# Patient Record
Sex: MALE | Race: BLACK OR AFRICAN AMERICAN | NOT HISPANIC OR LATINO | Employment: FULL TIME | URBAN - METROPOLITAN AREA
[De-identification: names, ages, dates, MRNs, and addresses within clinical notes are randomized per-mention and may not be internally consistent; named-entity substitution may affect disease eponyms.]

---

## 2017-02-17 ENCOUNTER — HOSPITAL ENCOUNTER (OUTPATIENT)
Facility: HOSPITAL | Age: 62
Discharge: HOME | End: 2017-02-23
Attending: EMERGENCY MEDICINE | Admitting: INTERNAL MEDICINE
Payer: COMMERCIAL

## 2017-02-17 DIAGNOSIS — E78.5 HYPERLIPIDEMIA, UNSPECIFIED HYPERLIPIDEMIA TYPE: ICD-10-CM

## 2017-02-17 DIAGNOSIS — R62.7 ADULT FAILURE TO THRIVE: ICD-10-CM

## 2017-02-17 DIAGNOSIS — M54.50 LOW BACK PAIN: ICD-10-CM

## 2017-02-17 DIAGNOSIS — F32.A DEPRESSION, UNSPECIFIED DEPRESSION TYPE: ICD-10-CM

## 2017-02-17 DIAGNOSIS — E11.40 TYPE 2 DIABETES MELLITUS WITH DIABETIC NEUROPATHY, WITHOUT LONG-TERM CURRENT USE OF INSULIN: ICD-10-CM

## 2017-02-17 DIAGNOSIS — D64.9 NORMOCYTIC ANEMIA: ICD-10-CM

## 2017-02-17 DIAGNOSIS — M54.9 CHRONIC BACK PAIN, UNSPECIFIED BACK LOCATION, UNSPECIFIED BACK PAIN LATERALITY: ICD-10-CM

## 2017-02-17 DIAGNOSIS — I10 ESSENTIAL HYPERTENSION: ICD-10-CM

## 2017-02-17 DIAGNOSIS — R26.9 GAIT DISTURBANCE: ICD-10-CM

## 2017-02-17 DIAGNOSIS — M51.16 LUMBAR DISC DISEASE WITH RADICULOPATHY: Primary | ICD-10-CM

## 2017-02-17 DIAGNOSIS — G89.29 CHRONIC BACK PAIN, UNSPECIFIED BACK LOCATION, UNSPECIFIED BACK PAIN LATERALITY: ICD-10-CM

## 2017-02-17 LAB
ALBUMIN SERPL BCP-MCNC: 3.4 G/DL
ALP SERPL-CCNC: 86 U/L
ALT SERPL W/O P-5'-P-CCNC: 25 U/L
ANION GAP SERPL CALC-SCNC: 12 MMOL/L
AST SERPL-CCNC: 21 U/L
BASOPHILS # BLD AUTO: 0.03 K/UL
BASOPHILS NFR BLD: 0.7 %
BILIRUB SERPL-MCNC: 0.6 MG/DL
BILIRUB UR QL STRIP: NEGATIVE
BUN SERPL-MCNC: 31 MG/DL
CALCIUM SERPL-MCNC: 10 MG/DL
CHLORIDE SERPL-SCNC: 102 MMOL/L
CLARITY UR REFRACT.AUTO: CLEAR
CO2 SERPL-SCNC: 24 MMOL/L
COLOR UR AUTO: YELLOW
CREAT SERPL-MCNC: 1.3 MG/DL
DIFFERENTIAL METHOD: ABNORMAL
EOSINOPHIL # BLD AUTO: 0.1 K/UL
EOSINOPHIL NFR BLD: 1.6 %
ERYTHROCYTE [DISTWIDTH] IN BLOOD BY AUTOMATED COUNT: 12.6 %
EST. GFR  (AFRICAN AMERICAN): >60 ML/MIN/1.73 M^2
EST. GFR  (NON AFRICAN AMERICAN): 59.3 ML/MIN/1.73 M^2
GLUCOSE SERPL-MCNC: 89 MG/DL
GLUCOSE UR QL STRIP: NEGATIVE
HCT VFR BLD AUTO: 36.8 %
HGB BLD-MCNC: 12.4 G/DL
HGB UR QL STRIP: NEGATIVE
INR PPP: 1
KETONES UR QL STRIP: NEGATIVE
LACTATE SERPL-SCNC: 1.5 MMOL/L
LEUKOCYTE ESTERASE UR QL STRIP: NEGATIVE
LYMPHOCYTES # BLD AUTO: 1.2 K/UL
LYMPHOCYTES NFR BLD: 27.6 %
MAGNESIUM SERPL-MCNC: 1.6 MG/DL
MCH RBC QN AUTO: 28.4 PG
MCHC RBC AUTO-ENTMCNC: 33.7 %
MCV RBC AUTO: 84 FL
MONOCYTES # BLD AUTO: 0.5 K/UL
MONOCYTES NFR BLD: 11.7 %
NEUTROPHILS # BLD AUTO: 2.6 K/UL
NEUTROPHILS NFR BLD: 58.2 %
NITRITE UR QL STRIP: NEGATIVE
PH UR STRIP: 6 [PH] (ref 5–8)
PHOSPHATE SERPL-MCNC: 3.6 MG/DL
PLATELET # BLD AUTO: 308 K/UL
PMV BLD AUTO: 9.4 FL
POCT GLUCOSE: 90 MG/DL (ref 70–110)
POTASSIUM SERPL-SCNC: 4.1 MMOL/L
PROT SERPL-MCNC: 9.5 G/DL
PROT UR QL STRIP: NEGATIVE
PROTHROMBIN TIME: 11 SEC
RBC # BLD AUTO: 4.37 M/UL
SODIUM SERPL-SCNC: 138 MMOL/L
SP GR UR STRIP: 1.01 (ref 1–1.03)
URN SPEC COLLECT METH UR: NORMAL
UROBILINOGEN UR STRIP-ACNC: NEGATIVE EU/DL
WBC # BLD AUTO: 4.46 K/UL

## 2017-02-17 PROCEDURE — 99285 EMERGENCY DEPT VISIT HI MDM: CPT

## 2017-02-17 PROCEDURE — 80053 COMPREHEN METABOLIC PANEL: CPT

## 2017-02-17 PROCEDURE — 25000003 PHARM REV CODE 250: Performed by: PHYSICIAN ASSISTANT

## 2017-02-17 PROCEDURE — 96361 HYDRATE IV INFUSION ADD-ON: CPT

## 2017-02-17 PROCEDURE — 83605 ASSAY OF LACTIC ACID: CPT

## 2017-02-17 PROCEDURE — 25000003 PHARM REV CODE 250: Performed by: EMERGENCY MEDICINE

## 2017-02-17 PROCEDURE — 99220 PR INITIAL OBSERVATION CARE,LEVL III: CPT | Mod: ,,, | Performed by: PHYSICIAN ASSISTANT

## 2017-02-17 PROCEDURE — 63600175 PHARM REV CODE 636 W HCPCS: Performed by: EMERGENCY MEDICINE

## 2017-02-17 PROCEDURE — 87086 URINE CULTURE/COLONY COUNT: CPT

## 2017-02-17 PROCEDURE — 99285 EMERGENCY DEPT VISIT HI MDM: CPT | Mod: ,,, | Performed by: EMERGENCY MEDICINE

## 2017-02-17 PROCEDURE — 85025 COMPLETE CBC W/AUTO DIFF WBC: CPT

## 2017-02-17 PROCEDURE — G0378 HOSPITAL OBSERVATION PER HR: HCPCS

## 2017-02-17 PROCEDURE — 84100 ASSAY OF PHOSPHORUS: CPT

## 2017-02-17 PROCEDURE — 83735 ASSAY OF MAGNESIUM: CPT

## 2017-02-17 PROCEDURE — 96375 TX/PRO/DX INJ NEW DRUG ADDON: CPT

## 2017-02-17 PROCEDURE — 82962 GLUCOSE BLOOD TEST: CPT

## 2017-02-17 PROCEDURE — 96374 THER/PROPH/DIAG INJ IV PUSH: CPT

## 2017-02-17 PROCEDURE — 81003 URINALYSIS AUTO W/O SCOPE: CPT

## 2017-02-17 PROCEDURE — 85610 PROTHROMBIN TIME: CPT

## 2017-02-17 RX ORDER — RAMELTEON 8 MG/1
8 TABLET ORAL NIGHTLY PRN
Status: DISCONTINUED | OUTPATIENT
Start: 2017-02-17 | End: 2017-02-23 | Stop reason: HOSPADM

## 2017-02-17 RX ORDER — TRAZODONE HYDROCHLORIDE 50 MG/1
50 TABLET ORAL NIGHTLY
Status: DISCONTINUED | OUTPATIENT
Start: 2017-02-17 | End: 2017-02-23 | Stop reason: HOSPADM

## 2017-02-17 RX ORDER — ACETAMINOPHEN 325 MG/1
650 TABLET ORAL EVERY 6 HOURS PRN
Status: DISCONTINUED | OUTPATIENT
Start: 2017-02-17 | End: 2017-02-23 | Stop reason: HOSPADM

## 2017-02-17 RX ORDER — BUPROPION HYDROCHLORIDE 100 MG/1
100 TABLET ORAL 2 TIMES DAILY
Status: DISCONTINUED | OUTPATIENT
Start: 2017-02-17 | End: 2017-02-23 | Stop reason: HOSPADM

## 2017-02-17 RX ORDER — TRAMADOL HYDROCHLORIDE 50 MG/1
100 TABLET ORAL
Status: COMPLETED | OUTPATIENT
Start: 2017-02-17 | End: 2017-02-17

## 2017-02-17 RX ORDER — IBUPROFEN 200 MG
200 TABLET ORAL EVERY 6 HOURS PRN
Status: DISCONTINUED | OUTPATIENT
Start: 2017-02-17 | End: 2017-02-23 | Stop reason: HOSPADM

## 2017-02-17 RX ORDER — DIAZEPAM 5 MG/1
5 TABLET ORAL
Status: COMPLETED | OUTPATIENT
Start: 2017-02-17 | End: 2017-02-17

## 2017-02-17 RX ORDER — VALSARTAN 320 MG/1
160 TABLET ORAL DAILY
COMMUNITY

## 2017-02-17 RX ORDER — IBUPROFEN 200 MG
200 TABLET ORAL EVERY 6 HOURS PRN
COMMUNITY

## 2017-02-17 RX ORDER — ONDANSETRON 8 MG/1
8 TABLET, ORALLY DISINTEGRATING ORAL EVERY 8 HOURS PRN
Status: DISCONTINUED | OUTPATIENT
Start: 2017-02-17 | End: 2017-02-23 | Stop reason: HOSPADM

## 2017-02-17 RX ORDER — HYDROCHLOROTHIAZIDE 25 MG/1
25 TABLET ORAL DAILY
Status: DISCONTINUED | OUTPATIENT
Start: 2017-02-17 | End: 2017-02-18

## 2017-02-17 RX ORDER — PRAVASTATIN SODIUM 40 MG/1
40 TABLET ORAL NIGHTLY
Status: DISCONTINUED | OUTPATIENT
Start: 2017-02-17 | End: 2017-02-23 | Stop reason: HOSPADM

## 2017-02-17 RX ORDER — VENLAFAXINE HYDROCHLORIDE 150 MG/1
150 CAPSULE, EXTENDED RELEASE ORAL DAILY
Status: DISCONTINUED | OUTPATIENT
Start: 2017-02-17 | End: 2017-02-23 | Stop reason: HOSPADM

## 2017-02-17 RX ORDER — GABAPENTIN 400 MG/1
400 CAPSULE ORAL 3 TIMES DAILY
Status: DISCONTINUED | OUTPATIENT
Start: 2017-02-17 | End: 2017-02-20

## 2017-02-17 RX ORDER — IBUPROFEN 200 MG
24 TABLET ORAL
Status: DISCONTINUED | OUTPATIENT
Start: 2017-02-17 | End: 2017-02-23 | Stop reason: HOSPADM

## 2017-02-17 RX ORDER — INSULIN ASPART 100 [IU]/ML
0-5 INJECTION, SOLUTION INTRAVENOUS; SUBCUTANEOUS
Status: DISCONTINUED | OUTPATIENT
Start: 2017-02-17 | End: 2017-02-23 | Stop reason: HOSPADM

## 2017-02-17 RX ORDER — METHOCARBAMOL 500 MG/1
1000 TABLET, FILM COATED ORAL 3 TIMES DAILY
COMMUNITY

## 2017-02-17 RX ORDER — ATENOLOL 25 MG/1
25 TABLET ORAL DAILY
Status: DISCONTINUED | OUTPATIENT
Start: 2017-02-17 | End: 2017-02-23 | Stop reason: HOSPADM

## 2017-02-17 RX ORDER — GLUCAGON 1 MG
1 KIT INJECTION
Status: DISCONTINUED | OUTPATIENT
Start: 2017-02-17 | End: 2017-02-23 | Stop reason: HOSPADM

## 2017-02-17 RX ORDER — VALSARTAN 160 MG/1
160 TABLET ORAL DAILY
Status: DISCONTINUED | OUTPATIENT
Start: 2017-02-17 | End: 2017-02-18

## 2017-02-17 RX ORDER — METHOCARBAMOL 500 MG/1
1000 TABLET, FILM COATED ORAL 3 TIMES DAILY
Status: DISCONTINUED | OUTPATIENT
Start: 2017-02-17 | End: 2017-02-23 | Stop reason: HOSPADM

## 2017-02-17 RX ORDER — IBUPROFEN 200 MG
16 TABLET ORAL
Status: DISCONTINUED | OUTPATIENT
Start: 2017-02-17 | End: 2017-02-23 | Stop reason: HOSPADM

## 2017-02-17 RX ORDER — TRAMADOL HYDROCHLORIDE 50 MG/1
50 TABLET ORAL EVERY 6 HOURS PRN
Status: DISCONTINUED | OUTPATIENT
Start: 2017-02-17 | End: 2017-02-23 | Stop reason: HOSPADM

## 2017-02-17 RX ORDER — KETOROLAC TROMETHAMINE 30 MG/ML
10 INJECTION, SOLUTION INTRAMUSCULAR; INTRAVENOUS
Status: COMPLETED | OUTPATIENT
Start: 2017-02-17 | End: 2017-02-17

## 2017-02-17 RX ORDER — ORPHENADRINE CITRATE 30 MG/ML
60 INJECTION INTRAMUSCULAR; INTRAVENOUS
Status: COMPLETED | OUTPATIENT
Start: 2017-02-17 | End: 2017-02-17

## 2017-02-17 RX ORDER — CALCIUM CARBONATE 500(1250)
500 TABLET ORAL EVERY 6 HOURS PRN
Status: DISCONTINUED | OUTPATIENT
Start: 2017-02-17 | End: 2017-02-23 | Stop reason: HOSPADM

## 2017-02-17 RX ORDER — ERYTHROMYCIN 5 MG/G
OINTMENT OPHTHALMIC NIGHTLY
Status: DISCONTINUED | OUTPATIENT
Start: 2017-02-17 | End: 2017-02-23 | Stop reason: HOSPADM

## 2017-02-17 RX ADMIN — METHOCARBAMOL 1000 MG: 500 TABLET ORAL at 09:02

## 2017-02-17 RX ADMIN — PRAVASTATIN SODIUM 40 MG: 40 TABLET ORAL at 08:02

## 2017-02-17 RX ADMIN — RAMELTEON 8 MG: 8 TABLET, FILM COATED ORAL at 08:02

## 2017-02-17 RX ADMIN — VENLAFAXINE HYDROCHLORIDE 150 MG: 150 CAPSULE, EXTENDED RELEASE ORAL at 06:02

## 2017-02-17 RX ADMIN — GABAPENTIN 400 MG: 400 CAPSULE ORAL at 09:02

## 2017-02-17 RX ADMIN — DIAZEPAM 5 MG: 5 TABLET ORAL at 10:02

## 2017-02-17 RX ADMIN — SODIUM CHLORIDE 1000 ML: 0.9 INJECTION, SOLUTION INTRAVENOUS at 10:02

## 2017-02-17 RX ADMIN — TRAZODONE HYDROCHLORIDE 50 MG: 50 TABLET ORAL at 08:02

## 2017-02-17 RX ADMIN — VALSARTAN 160 MG: 160 TABLET, FILM COATED ORAL at 06:02

## 2017-02-17 RX ADMIN — BUPROPION HYDROCHLORIDE 100 MG: 100 TABLET, FILM COATED ORAL at 08:02

## 2017-02-17 RX ADMIN — HYDROCHLOROTHIAZIDE 25 MG: 25 TABLET ORAL at 06:02

## 2017-02-17 RX ADMIN — TRAMADOL HYDROCHLORIDE 100 MG: 50 TABLET, COATED ORAL at 12:02

## 2017-02-17 RX ADMIN — ORPHENADRINE CITRATE 60 MG: 30 INJECTION INTRAMUSCULAR; INTRAVENOUS at 10:02

## 2017-02-17 RX ADMIN — KETOROLAC TROMETHAMINE 30 MG: 30 INJECTION, SOLUTION INTRAMUSCULAR at 10:02

## 2017-02-17 RX ADMIN — ATENOLOL 25 MG: 25 TABLET ORAL at 06:02

## 2017-02-17 RX ADMIN — DIAZEPAM 5 MG: 5 TABLET ORAL at 12:02
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
## 2017-02-17 NOTE — ED NOTES
EMS reports patient urinated on himself because he was unable to get to restroom and not due to loss of bladder control.
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
## 2017-02-17 NOTE — ASSESSMENT & PLAN NOTE
MRI Lumbar and Thoracic Spine shows moderate foraminal stenoses at L5-S1 related to bilateral facet arthropathy with fluid in the facet joints and a mild circumferential disc bulge.  Pt with one time episode of bladder incontinence however pt reports episode occurred b/c he could not make it to the bathroom due to pain.  Discussed MRI and case with Neurosurgery.  Neurosurgery declined consult at this time and recommended measuring post void residual.  If residual >100 recommend starting flomax.  Will continue current home pain regimen:  Ibuprofen 200 mg q 6 hrs prn, tramadol 50 mg q 6 hrs prn.  PT/OT consult.
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
## 2017-02-17 NOTE — ASSESSMENT & PLAN NOTE
Pt denies SI.  Pt stopped taking wellbutrin.  [facility] 10/2016.  Resume home medications.

## 2017-02-17 NOTE — ASSESSMENT & PLAN NOTE
Continue gabapentin.  Pt has rx bottle with him, gabapentin 400 mg tid.  Hold oral medications.  Hyper/hypoglycemia protocols in place.  Diabetic diet.
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
## 2017-02-17 NOTE — IP AVS SNAPSHOT
[facility]  [address]  [facility] [address]  Phone: [phone]           Patient Discharge Instructions     Our goal is to set you up for success. This packet includes information on your condition, medications, and your home care. It will help you to care for yourself so you don't get sicker and need to go back to the hospital.     Please ask your nurse if you have any questions.        There are many details to remember when preparing to leave the hospital. Here is what you will need to do:    1. Take your medicine. If you are prescribed medications, review your Medication List in the following pages. You may have new medications to  at the pharmacy and others that you'll need to stop taking. Review the instructions for how and when to take your medications. Talk with your doctor or nurses if you are unsure of what to do.     2. Go to your follow-up appointments. Specific follow-up information is listed in the following pages. Your may be contacted by a transition nurse or clinical provider about future appointments. Be sure we have all of the phone numbers to reach you, if needed. Please contact your provider's office if you are unable to make an appointment.     3. Watch for warning signs. Your doctor or nurse will give you detailed warning signs to watch for and when to call for assistance. These instructions may also include educational information about your condition. If you experience any of warning signs to your health, call your doctor.               [facility]  Unless otherwise directed by your provider, please contact [facility], our nurse care line that is available for 24/7 assistance.     [phone] (toll-free)    Registered nurses in the [facility] provide clinical advisement, health education, appointment booking, and other advisory services.                    ** Verify the list of medication(s) below is accurate and up  to date. Carry this with you in case of emergency. If your medications have changed, please notify your healthcare provider.             Medication List      START taking these medications        Additional Info                      pravastatin 40 MG tablet   Commonly known as:  PRAVACHOL   Quantity:  30 tablet   Refills:  0   Dose:  40 mg    Last time this was given:  40 mg on 2/22/2017  9:00 PM   Instructions:  Take 1 tablet (40 mg total) by mouth every evening.     Begin Date    AM    Noon    PM    Bedtime       venlafaxine 150 MG Cp24   Commonly known as:  EFFEXOR-XR   Quantity:  30 capsule   Refills:  0   Dose:  150 mg    Last time this was given:  150 mg on 2/23/2017  9:20 AM   Instructions:  Take 1 capsule (150 mg total) by mouth once daily.     Begin Date    AM    Noon    PM    Bedtime         CHANGE how you take these medications        Additional Info                      gabapentin 100 MG capsule   Commonly known as:  NEURONTIN   Quantity:  30 capsule   Refills:  0   Dose:  600 mg   What changed:  how much to take    Last time this was given:  600 mg on 2/23/2017  6:04 AM   Instructions:  Take 6 capsules (600 mg total) by mouth 3 (three) times daily.     Begin Date    AM    Noon    PM    Bedtime         CONTINUE taking these medications        Additional Info                      atenolol 25 MG tablet   Commonly known as:  TENORMIN   Refills:  0   Dose:  25 mg    Last time this was given:  25 mg on 2/23/2017  9:18 AM   Instructions:  Take 25 mg by mouth once daily.     Begin Date    AM    Noon    PM    Bedtime       buPROPion 100 MG tablet   Commonly known as:  WELLBUTRIN   Refills:  0   Dose:  100 mg    Last time this was given:  100 mg on 2/23/2017  9:20 AM   Instructions:  Take 100 mg by mouth 2 (two) times daily.     Begin Date    AM    Noon    PM    Bedtime       erythromycin ophthalmic ointment   Commonly known as:  ROMYCIN   Refills:  0    Last time this was given:  1 inch on 2/22/2017  8:56 PM    Instructions:  every evening.     Begin Date    AM    Noon    PM    Bedtime       glipiZIDE 10 MG tablet   Commonly known as:  GLUCOTROL   Refills:  0   Dose:  10 mg    Instructions:  Take 10 mg by mouth 2 (two) times daily before meals.     Begin Date    AM    Noon    PM    Bedtime       hydrocortisone 0.5 % cream   Refills:  0    Instructions:  Apply topically 2 (two) times daily.     Begin Date    AM    Noon    PM    Bedtime       ibuprofen 200 MG tablet   Commonly known as:  ADVIL,MOTRIN   Refills:  0   Dose:  200 mg    Instructions:  Take 200 mg by mouth every 6 (six) hours as needed for Pain.     Begin Date    AM    Noon    PM    Bedtime       metformin 1,000 mg 24 hr tablet   Commonly known as:  FORTAMET   Refills:  0   Dose:  1000 mg    Instructions:  Take 1,000 mg by mouth daily with breakfast.     Begin Date    AM    Noon    PM    Bedtime       methocarbamol 500 MG Tab   Commonly known as:  ROBAXIN   Refills:  0   Dose:  1000 mg    Last time this was given:  1,000 mg on 2/23/2017  6:04 AM   Instructions:  Take 1,000 mg by mouth 3 (three) times daily.     Begin Date    AM    Noon    PM    Bedtime       ondansetron 4 MG Tbdl   Commonly known as:  ZOFRAN-ODT   Quantity:  20 tablet   Refills:  0   Dose:  4 mg    Instructions:  Take 1 tablet (4 mg total) by mouth every 6 (six) hours as needed.     Begin Date    AM    Noon    PM    Bedtime       tramadol 50 mg tablet   Commonly known as:  ULTRAM   Refills:  0   Dose:  50 mg    Last time this was given:  50 mg on 2/22/2017  8:55 PM   Instructions:  Take 50 mg by mouth every 6 (six) hours as needed for Pain.     Begin Date    AM    Noon    PM    Bedtime       trazodone 50 MG tablet   Commonly known as:  DESYREL   Refills:  0   Dose:  50 mg    Last time this was given:  50 mg on 2/22/2017  9:00 PM   Instructions:  Take 50 mg by mouth every evening.     Begin Date    AM    Noon    PM    Bedtime       valsartan 320 MG tablet   Commonly known as:  DIOVAN   Refills:   0   Dose:  160 mg    Last time this was given:  160 mg on 2/23/2017  9:19 AM   Instructions:  Take 160 mg by mouth once daily.     Begin Date    AM    Noon    PM    Bedtime       zolpidem 5 MG Tab   Commonly known as:  AMBIEN   Refills:  0   Dose:  5 mg    Instructions:  Take 5 mg by mouth nightly as needed.     Begin Date    AM    Noon    PM    Bedtime            Where to Get Your Medications      You can get these medications from any pharmacy     Bring a paper prescription for each of these medications     gabapentin 100 MG capsule    pravastatin 40 MG tablet    venlafaxine 150 MG Cp24                  Please bring to all follow up appointments:    1. A copy of your discharge instructions.  2. All medicines you are currently taking in their original bottles.  3. Identification and insurance card.    Please arrive 15 minutes ahead of scheduled appointment time.    Please call 24 hours in advance if you must reschedule your appointment and/or time.        Follow-up Information     Follow up with Dr Martine Hess On 2/24/2017.    Why:  HOSPITAL FOLLOW UP with Dr Martine Hess 2/24/2017 @ 3:00pm, please bring picture ID, Insurance Cards and all Medications.    Contact information:    [address] [facility], [address]  [facility]  p: [phone]   f: [phone]      Referrals     Future Orders    Ambulatory Referral to Physical/Occupational Therapy     Questions:    Post Surgical?:  No    Eval and Treat:  Yes    Duration:  90 days    Frequency (times per week):  Five    Precautions:      Location:      OT Location:      Restore Functional ADL Training?:  Yes    Gait Training:  Yes    Therapeutic Exercises:      Wound Care:      Traction:      Electric Stimulation:      IONTO.:      U/S:      Developmental Stimulation?:      Specialty Programs:          Discharge Instructions     Future Orders    Activity as tolerated     Call MD for:  redness, tenderness, or signs of infection (pain, swelling, redness,  "odor or green/yellow discharge around incision site)     Call MD for:  severe uncontrolled pain     Call MD for:  temperature >100.4     Diet Diabetic 1800 Calories         Primary Diagnosis     Your primary diagnosis was:  Nerve Pain Due To Slipped Disc      Admission Information     Date & Time Provider Department CSN    2/17/2017  9:20 AM Key Hazel MD [facility] [id number]      Care Providers     Provider Role Specialty Primary office phone    Key Hazel MD Attending Provider Hospitalist [phone]    Carlo Reinoso MD Team Attending  Hospitalist [phone]      Your Vitals Were     BP Pulse Temp Resp Height Weight    159/84 (BP Location: Right arm, Patient Position: Lying, BP Method: Automatic) 69 98.8 °F (37.1 °C) (Oral) 14 5' 11" (1.803 m) 95.3 kg (210 lb)    SpO2 BMI             96% 29.29 kg/m2         Recent Lab Values     No lab values to display.      Allergies as of 2/23/2017        Reactions    Shellfish Containing Products Anaphylaxis    Percocet [Oxycodone-acetaminophen]     Vicodin [Hydrocodone-acetaminophen] Nausea And Vomiting      Advance Directives     An advance directive is a document which, in the event you are no longer able to make decisions for yourself, tells your healthcare team what kind of treatment you do or do not want to receive, or who you would like to make those decisions for you.  If you do not currently have an advance directive, [facility] encourages you to create one.  For more information call:  [phone]), [phone] ([phone]),  or log on to [email or web address].        Language Assistance Services     ATTENTION: Language assistance services are available, free of charge. Please call [phone].      ATENCIÓN: Si habla africa, tiene a álvarez disposición servicios gratuitos de asistencia lingüística. Llame al [phone].     CHÚ Ý: N?u b?n nói Ti?ng Vi?t, có các d?ch v? h? tr? ngôn ng? mi?n phí dành cho b?n. " G?i s? [phone].        Diabetes Discharge Instructions                                   [facility] Sign-Up     Activating your [facility] account is as easy as 1-2-3!     1) Visit [email or web address], select Sign Up Now, enter this activation code and your date of birth, then select Next.  [email or web address]  Expires: 4/8/2017  2:44 PM      2) Create a username and password to use when you visit [facility] in the future and select a security question in case you lose your password and select Next.    3) Enter your e-mail address and click Sign Up!    Additional Information  If you have questions, please e-mail [email or web address] or call [phone] to talk to our [facility] staff. Remember, [facility] is NOT to be used for urgent needs. For medical emergencies, dial 911.          [facility] complies with applicable Federal civil rights laws and does not discriminate on the basis of race, color, national origin, age, disability, or sex.

## 2017-02-17 NOTE — SUBJECTIVE & OBJECTIVE
Past Medical History   Diagnosis Date    Chronic back pain 2/17/2017    Depression     Depression 2/17/2017    Diabetes mellitus     HLD (hyperlipidemia) 2/17/2017    HTN (hypertension) 2/17/2017    Hypertension     Type 2 diabetes mellitus with diabetic neuropathy 2/17/2017       Past Surgical History   Procedure Laterality Date    Hand surgery         Review of patient's allergies indicates:   Allergen Reactions    Percocet [oxycodone-acetaminophen]     Vicodin [hydrocodone-acetaminophen] Nausea And Vomiting       No current facility-administered medications on file prior to encounter.      Current Outpatient Prescriptions on File Prior to Encounter   Medication Sig    hydrocortisone 0.5 % cream Apply topically 2 (two) times daily.    atenolol (TENORMIN) 25 MG tablet Take 25 mg by mouth once daily.    buPROPion (WELLBUTRIN) 100 MG tablet Take 100 mg by mouth 2 (two) times daily.    erythromycin (ROMYCIN) ophthalmic ointment every evening.    gabapentin (NEURONTIN) 100 MG capsule Take 100 mg by mouth 3 (three) times daily.    glipiZIDE (GLUCOTROL) 10 MG tablet Take 10 mg by mouth 2 (two) times daily before meals.    metformin (FORTAMET) 1,000 mg 24 hr tablet Take 1,000 mg by mouth daily with breakfast.    ondansetron (ZOFRAN-ODT) 4 MG TbDL Take 1 tablet (4 mg total) by mouth every 6 (six) hours as needed.    tramadol (ULTRAM) 50 mg tablet Take 50 mg by mouth every 6 (six) hours as needed for Pain.    trazodone (DESYREL) 50 MG tablet Take 50 mg by mouth every evening.    zolpidem (AMBIEN) 5 MG Tab Take 5 mg by mouth nightly as needed.     Family History     None        Social History Main Topics    Smoking status: Never Smoker    Smokeless tobacco: Not on file    Alcohol use Yes      Comment: occ    Drug use: Not on file    Sexual activity: Not on file     Review of Systems   Constitutional: Positive for activity change and appetite change. Negative for chills, diaphoresis, fatigue and  unexpected weight change.   HENT: Negative for congestion, facial swelling, trouble swallowing and voice change.    Eyes: Negative for photophobia, pain, redness and visual disturbance.   Respiratory: Negative for choking, chest tightness, shortness of breath and wheezing.    Cardiovascular: Negative for chest pain, palpitations and leg swelling.   Gastrointestinal: Negative for abdominal pain, constipation, diarrhea, nausea and vomiting.   Endocrine: Negative for cold intolerance, heat intolerance, polydipsia and polyuria.   Genitourinary: Negative for flank pain, frequency, hematuria and urgency.   Musculoskeletal: Positive for arthralgias, back pain and gait problem. Negative for myalgias, neck pain and neck stiffness.   Skin: Negative for color change, pallor, rash and wound.   Neurological: Positive for numbness. Negative for dizziness, tremors, syncope, facial asymmetry, speech difficulty, weakness and headaches.   Hematological: Negative for adenopathy. Does not bruise/bleed easily.   Psychiatric/Behavioral: Positive for dysphoric mood and sleep disturbance. Negative for agitation, confusion and suicidal ideas.     Objective:     Vital Signs (Most Recent):  Temp: 98.4 °F (36.9 °C) (02/17/17 0917)  Pulse: 63 (02/17/17 1528)  Resp: 20 (02/17/17 1528)  BP: (!) 148/76 (02/17/17 1528)  SpO2: 99 % (02/17/17 1528) Vital Signs (24h Range):  Temp:  [98.4 °F (36.9 °C)] 98.4 °F (36.9 °C)  Pulse:  [63-74] 63  Resp:  [14-20] 20  SpO2:  [98 %-99 %] 99 %  BP: (121-150)/(70-76) 148/76     Weight: 95.3 kg (210 lb)  Body mass index is 29.29 kg/(m^2).    Physical Exam   Constitutional: He is oriented to person, place, and time. He appears well-developed and well-nourished.   HENT:   Head: Normocephalic and atraumatic.   Eyes: EOM are normal. Pupils are equal, round, and reactive to light.   Neck: Normal range of motion. Neck supple. No tracheal deviation present. No thyromegaly present.   Cardiovascular: Normal rate and regular  rhythm.    No murmur heard.  Pulmonary/Chest: Effort normal and breath sounds normal. He has no wheezes.   Abdominal: Soft. Bowel sounds are normal. There is no tenderness.   Musculoskeletal: He exhibits no edema.        Right hip: He exhibits decreased range of motion.        Lumbar back: He exhibits tenderness.   Lymphadenopathy:     He has no cervical adenopathy.   Neurological: He is alert and oriented to person, place, and time. He has normal reflexes. No cranial nerve deficit or sensory deficit. GCS eye subscore is 4. GCS verbal subscore is 5. GCS motor subscore is 6.   Skin: Skin is warm and dry.   Psychiatric: He has a normal mood and affect. His behavior is normal.   Nursing note and vitals reviewed.       Significant Labs: All pertinent labs within the past 24 hours have been reviewed.    Significant Imaging: I have reviewed all pertinent imaging results/findings within the past 24 hours.

## 2017-02-17 NOTE — ED NOTES
Pain in back is a one or two without moving. When moving pain is a 5/6 on s 1/10 pain scale. NSR noted on cardiac monitor. Hob up 30 degrees

## 2017-02-18 PROBLEM — D64.9 NORMOCYTIC ANEMIA: Status: ACTIVE | Noted: 2017-02-18

## 2017-02-18 LAB
ANION GAP SERPL CALC-SCNC: 8 MMOL/L
BACTERIA UR CULT: NO GROWTH
BASOPHILS # BLD AUTO: 0.02 K/UL
BASOPHILS # BLD AUTO: 0.03 K/UL
BASOPHILS NFR BLD: 0.5 %
BASOPHILS NFR BLD: 0.6 %
BUN SERPL-MCNC: 34 MG/DL
CALCIUM SERPL-MCNC: 8.9 MG/DL
CHLORIDE SERPL-SCNC: 105 MMOL/L
CO2 SERPL-SCNC: 24 MMOL/L
CREAT SERPL-MCNC: 1.5 MG/DL
DIFFERENTIAL METHOD: ABNORMAL
DIFFERENTIAL METHOD: ABNORMAL
EOSINOPHIL # BLD AUTO: 0.1 K/UL
EOSINOPHIL # BLD AUTO: 0.2 K/UL
EOSINOPHIL NFR BLD: 3 %
EOSINOPHIL NFR BLD: 3.2 %
ERYTHROCYTE [DISTWIDTH] IN BLOOD BY AUTOMATED COUNT: 12.6 %
ERYTHROCYTE [DISTWIDTH] IN BLOOD BY AUTOMATED COUNT: 12.6 %
EST. GFR  (AFRICAN AMERICAN): 57.7 ML/MIN/1.73 M^2
EST. GFR  (NON AFRICAN AMERICAN): 49.9 ML/MIN/1.73 M^2
GLUCOSE SERPL-MCNC: 118 MG/DL
HCT VFR BLD AUTO: 30.5 %
HCT VFR BLD AUTO: 30.7 %
HGB BLD-MCNC: 10.2 G/DL
HGB BLD-MCNC: 10.3 G/DL
LYMPHOCYTES # BLD AUTO: 1.3 K/UL
LYMPHOCYTES # BLD AUTO: 1.6 K/UL
LYMPHOCYTES NFR BLD: 33.1 %
LYMPHOCYTES NFR BLD: 33.9 %
MCH RBC QN AUTO: 28.3 PG
MCH RBC QN AUTO: 28.5 PG
MCHC RBC AUTO-ENTMCNC: 33.2 %
MCHC RBC AUTO-ENTMCNC: 33.8 %
MCV RBC AUTO: 84 FL
MCV RBC AUTO: 85 FL
MONOCYTES # BLD AUTO: 0.6 K/UL
MONOCYTES # BLD AUTO: 0.7 K/UL
MONOCYTES NFR BLD: 14.1 %
MONOCYTES NFR BLD: 15.6 %
NEUTROPHILS # BLD AUTO: 1.8 K/UL
NEUTROPHILS # BLD AUTO: 2.3 K/UL
NEUTROPHILS NFR BLD: 47 %
NEUTROPHILS NFR BLD: 49 %
PLATELET # BLD AUTO: 260 K/UL
PLATELET # BLD AUTO: 298 K/UL
PMV BLD AUTO: 9.1 FL
PMV BLD AUTO: 9.6 FL
POCT GLUCOSE: 113 MG/DL (ref 70–110)
POCT GLUCOSE: 131 MG/DL (ref 70–110)
POCT GLUCOSE: 133 MG/DL (ref 70–110)
POCT GLUCOSE: 144 MG/DL (ref 70–110)
POCT GLUCOSE: 152 MG/DL (ref 70–110)
POTASSIUM SERPL-SCNC: 4.3 MMOL/L
RBC # BLD AUTO: 3.61 M/UL
RBC # BLD AUTO: 3.62 M/UL
SODIUM SERPL-SCNC: 137 MMOL/L
WBC # BLD AUTO: 3.72 K/UL
WBC # BLD AUTO: 4.68 K/UL

## 2017-02-18 PROCEDURE — G8988 SELF CARE GOAL STATUS: HCPCS | Mod: CJ

## 2017-02-18 PROCEDURE — 82962 GLUCOSE BLOOD TEST: CPT

## 2017-02-18 PROCEDURE — G8987 SELF CARE CURRENT STATUS: HCPCS | Mod: CL

## 2017-02-18 PROCEDURE — G0378 HOSPITAL OBSERVATION PER HR: HCPCS

## 2017-02-18 PROCEDURE — 97165 OT EVAL LOW COMPLEX 30 MIN: CPT

## 2017-02-18 PROCEDURE — 80048 BASIC METABOLIC PNL TOTAL CA: CPT

## 2017-02-18 PROCEDURE — G8981 BODY POS CURRENT STATUS: HCPCS | Mod: CM

## 2017-02-18 PROCEDURE — 25000003 PHARM REV CODE 250: Performed by: PHYSICIAN ASSISTANT

## 2017-02-18 PROCEDURE — 36415 COLL VENOUS BLD VENIPUNCTURE: CPT

## 2017-02-18 PROCEDURE — 51798 US URINE CAPACITY MEASURE: CPT

## 2017-02-18 PROCEDURE — 63600175 PHARM REV CODE 636 W HCPCS: Performed by: PHYSICIAN ASSISTANT

## 2017-02-18 PROCEDURE — 97161 PT EVAL LOW COMPLEX 20 MIN: CPT

## 2017-02-18 PROCEDURE — 97530 THERAPEUTIC ACTIVITIES: CPT

## 2017-02-18 PROCEDURE — 99226 PR SUBSEQUENT OBSERVATION CARE,LEVEL III: CPT | Mod: ,,, | Performed by: PHYSICIAN ASSISTANT

## 2017-02-18 PROCEDURE — G8982 BODY POS GOAL STATUS: HCPCS | Mod: CL

## 2017-02-18 PROCEDURE — 85025 COMPLETE CBC W/AUTO DIFF WBC: CPT | Mod: 91

## 2017-02-18 PROCEDURE — 97535 SELF CARE MNGMENT TRAINING: CPT

## 2017-02-18 RX ORDER — ENOXAPARIN SODIUM 100 MG/ML
40 INJECTION SUBCUTANEOUS EVERY 24 HOURS
Status: DISCONTINUED | OUTPATIENT
Start: 2017-02-18 | End: 2017-02-23 | Stop reason: HOSPADM

## 2017-02-18 RX ORDER — ONDANSETRON 2 MG/ML
4 INJECTION INTRAMUSCULAR; INTRAVENOUS EVERY 12 HOURS PRN
Status: DISCONTINUED | OUTPATIENT
Start: 2017-02-18 | End: 2017-02-23 | Stop reason: HOSPADM

## 2017-02-18 RX ORDER — MORPHINE SULFATE 2 MG/ML
2 INJECTION, SOLUTION INTRAMUSCULAR; INTRAVENOUS EVERY 4 HOURS PRN
Status: DISCONTINUED | OUTPATIENT
Start: 2017-02-18 | End: 2017-02-18

## 2017-02-18 RX ADMIN — BUPROPION HYDROCHLORIDE 100 MG: 100 TABLET, FILM COATED ORAL at 08:02

## 2017-02-18 RX ADMIN — GABAPENTIN 400 MG: 400 CAPSULE ORAL at 10:02

## 2017-02-18 RX ADMIN — PRAVASTATIN SODIUM 40 MG: 40 TABLET ORAL at 08:02

## 2017-02-18 RX ADMIN — VENLAFAXINE HYDROCHLORIDE 150 MG: 150 CAPSULE, EXTENDED RELEASE ORAL at 11:02

## 2017-02-18 RX ADMIN — ATENOLOL 25 MG: 25 TABLET ORAL at 09:02

## 2017-02-18 RX ADMIN — HYDROCHLOROTHIAZIDE 25 MG: 25 TABLET ORAL at 09:02

## 2017-02-18 RX ADMIN — ERYTHROMYCIN 1 INCH: 5 OINTMENT OPHTHALMIC at 08:02

## 2017-02-18 RX ADMIN — GABAPENTIN 400 MG: 400 CAPSULE ORAL at 01:02

## 2017-02-18 RX ADMIN — METHOCARBAMOL 1000 MG: 500 TABLET ORAL at 01:02

## 2017-02-18 RX ADMIN — BUPROPION HYDROCHLORIDE 100 MG: 100 TABLET, FILM COATED ORAL at 12:02

## 2017-02-18 RX ADMIN — TRAZODONE HYDROCHLORIDE 50 MG: 50 TABLET ORAL at 09:02

## 2017-02-18 RX ADMIN — METHOCARBAMOL 1000 MG: 500 TABLET ORAL at 09:02

## 2017-02-18 RX ADMIN — TRAMADOL HYDROCHLORIDE 50 MG: 50 TABLET, FILM COATED ORAL at 09:02

## 2017-02-18 RX ADMIN — TRAMADOL HYDROCHLORIDE 50 MG: 50 TABLET, FILM COATED ORAL at 05:02

## 2017-02-18 RX ADMIN — TRAMADOL HYDROCHLORIDE 50 MG: 50 TABLET, FILM COATED ORAL at 01:02

## 2017-02-18 RX ADMIN — ENOXAPARIN SODIUM 40 MG: 100 INJECTION SUBCUTANEOUS at 12:02

## 2017-02-18 RX ADMIN — VALSARTAN 160 MG: 160 TABLET, FILM COATED ORAL at 09:02

## 2017-02-18 RX ADMIN — GABAPENTIN 400 MG: 400 CAPSULE ORAL at 05:02

## 2017-02-18 RX ADMIN — METHOCARBAMOL 1000 MG: 500 TABLET ORAL at 05:02

## 2017-02-18 NOTE — PT/OT/SLP EVAL
Physical Therapy  Evaluation    Luis Bryant   MRN: [id number]   Admitting Diagnosis: Lumbar disc disease with radiculopathy    PT Received On: 17  PT Start Time: 908     PT Stop Time: 954    PT Total Time (min): 46 min       Billable Minutes:  Evaluation 20 and Therapeutic Activity 26    Diagnosis: Lumbar disc disease with radiculopathy      Past Medical History   Diagnosis Date    Chronic back pain 2017    Depression     Depression 2017    Diabetes mellitus     HLD (hyperlipidemia) 2017    HTN (hypertension) 2017    Hypertension     Normocytic anemia 2017    Type 2 diabetes mellitus with diabetic neuropathy 2017      Past Surgical History   Procedure Laterality Date    Hand surgery         Referring physician: Carlo Reinoso MD  Date referred to PT: 17    General Precautions: Standard, fall  Orthopedic Precautions: N/A   Braces: N/A       Do you have any cultural, spiritual, Yazdanism conflicts, given your current situation?: none    Patient History:  Lives With: alone  Living Arrangements: house  Home Accessibility: stairs to enter home  Home Layout: Able to live on 1st floor  Number of Stairs to Enter Home: 1  Stair Railings at Home: none  Living Environment Comment: Pt lives with 45 y.o son  Equipment Currently Used at Home: walker, rolling, cane, straight  DME owned (not currently used): rolling walker and single point cane    Previous Level of Function:  Ambulation Skills: needs device (use of RW)  Transfer Skills: needs assist  ADL Skills: needs assist (for dressing)  Work/Leisure Activity: unable to perform    Subjective:  Communicated with nursing prior to session.    Chief Complaint: LBP  Patient goals: To reduce pain    Pain Ratin/10   Location - Side: Right     Location: leg  Pain Addressed: Reposition, Pre-medicate for activity, Distraction, Cessation of Activity, Nurse notified       Objective:   Patient found with: peripheral IV     Cognitive  Exam:  Oriented to: Person, Place, Time and Situation    Follows Commands/attention: Follows multistep  commands  Communication: clear/fluent  Safety awareness/insight to disability: intact    Physical Exam:  Postural examination/scapula alignment: No postural abnormalities identified    Skin integrity: Dry  Edema: Pitting R foot, non-pitting mild edema L lower leg    Sensation:   Intact    Upper Extremity Range of Motion:  Right Upper Extremity: WFL  Left Upper Extremity: WFL    Upper Extremity Strength:  Right Upper Extremity: WFL - Pain with R UE MMT  Left Upper Extremity: WFL    Lower Extremity Range of Motion:  Right Lower Extremity: Testing perf in bed, unable to achieve ROM in gravity A position  Left Lower Extremity: Testing perf in bed, required Laurent for heel slides into kn flex    Lower Extremity Strength:  Right Lower Extremity: Pt unable to resist for MMT without screaming in pain, 1+/5  Left Lower Extremity: 2+/5 throughout, except L ankle DF 4/5     Fine motor coordination:  Impaired      Gross motor coordination: WFL    Functional Mobility:  Bed Mobility:  Rolling/Turning to Left:  (Pt refused)  Rolling/Turning Right:  (Pt refused)  Scooting/Bridging: Stand by Assistance  Supine to Sit: Maximum Assistance    Transfers:  Sit <> Stand Assistance:  (Pt unable to perf)    Gait:   Gait Distance: Pt unable to perf      Balance:   Static Sit: 0: Needs MAXIMAL assist to maintain sitting without back support  Dynamic Sit: 0: N/A  Static Stand: 0: Needs MAXIMAL assist to maintain   Dynamic stand: 0: N/A    Therapeutic Activities and Exercises:  Pt attempted to perf supine to sit without A, able to scoot LEs together laterally to L towards EOB, pt began shouting in pain.  Attempted a log roll t/f to sit EOB, pt began screaming with min movements of B LEs.  Pt requires totalA for scooting towards HOB in supine.  Refused rolling.  Pt reports pain during MMT: with L testing of hamstrings.  No reports of pain when  testing gluts.      AM-PAC 6 CLICK MOBILITY  How much help from another person does this patient currently need?   1 = Unable, Total/Dependent Assistance  2 = A lot, Maximum/Moderate Assistance  3 = A little, Minimum/Contact Guard/Supervision  4 = None, Modified Lynn/Independent    Turning over in bed (including adjusting bedclothes, sheets and blankets)?: 2  Sitting down on and standing up from a chair with arms (e.g., wheelchair, bedside commode, etc.): 1  Moving from lying on back to sitting on the side of the bed?: 1  Moving to and from a bed to a chair (including a wheelchair)?: 1  Need to walk in hospital room?: 1  Climbing 3-5 steps with a railing?: 1  Total Score: 7     AM-PAC Raw Score CMS G-Code Modifier Level of Impairment Assistance   6 % Total / Unable   7 - 9 CM 80 - 100% Maximal Assist   10 - 14 CL 60 - 80% Moderate Assist   15 - 19 CK 40 - 60% Moderate Assist   20 - 22 CJ 20 - 40% Minimal Assist   23 CI 1-20% SBA / CGA   24 CH 0% Independent/ Mod I     Patient left supine with all lines intact, call button in reach and nursing notified.    Assessment:   Luis Bryant is a 61 y.o. male with a medical diagnosis of Lumbar disc disease with radiculopathy.  Pt is currently unable/unwilling to move from supine position.  Numerous attempts made to facilitate a transfer sup-->sit, although pt unable at this time sec to screaming in pain.  C/o pain during MMT are inconsistent with radiculopathy reports and normal findings.  C/o pain when testing hamstrings, although no c/o p! When testing hip flexors or extensors.  No reports of numbness or tingling.  Discussed with nsg, providing the pt with a foam wedge for pressure relieving positioning in bed, as pt does not want to roll sec to pain.  Concerns regarding skin breakdown as pt has a prolonged time in bed.  Pt requires skilled PT services to address the above impairments.      Rehab identified problem list/impairments: Rehab identified problem  list/impairments: weakness, impaired endurance, impaired self care skills, impaired functional mobilty, gait instability, impaired balance, decreased coordination, decreased lower extremity function, pain, decreased safety awareness    Rehab potential is fair.    Activity tolerance: Poor    Discharge recommendations: Discharge Facility/Level Of Care Needs: nursing facility, skilled     Barriers to discharge: Barriers to Discharge: Inaccessible home environment, Decreased caregiver support    Equipment recommendations: Equipment Needed After Discharge: bedside commode, bath bench, wheelchair     GOALS:   Physical Therapy Goals        Problem: Physical Therapy Goal    Goal Priority Disciplines Outcome Goal Variances Interventions   Physical Therapy Goal     PT/OT, PT Ongoing (interventions implemented as appropriate)     Description:  Goals to be met by: 3/4/17     Patient will increase functional independence with mobility by performin. Supine to sit with MInimal Assistance.  2. Sit to supine with MInimal Assistance.  3. Rolling to Left and Right with Minimal Assistance.  4. Sit to stand transfer with Minimal Assistance.  5. Bed to chair transfer with Minimal Assistance using Rolling Walker.  6. Gait  x 120 feet with Minimal Assistance using Rolling Walker.   6. Ascend/descend 1 stair with bilateral Handrails Minimal Assistance.   7. Sitting at edge of bed x 10 minutes with Supervision.  8. Stand for 10 minutes with Laurent using Rolling Walker.  9. Lower extremity exercise program x 20 reps per handout, with assistance as needed.                PLAN:    Patient to be seen 5 x/week to address the above listed problems via therapeutic activities, gait training, therapeutic exercises, neuromuscular re-education  Plan of Care expires: 17  Plan of Care reviewed with: patient          Jerrica Zhaoin, PT  2017

## 2017-02-18 NOTE — PT/OT/SLP EVAL
"Occupational Therapy  Evaluation    Luis Bryant   MRN: [id number]   Admitting Diagnosis: Lumbar disc disease with radiculopathy    OT Date of Treatment: 02/18/17   OT Start Time: 0909  OT Stop Time: 0950  OT Total Time (min): 41 min    Billable Minutes:  Evaluation 33 min  Self care 8 min  Co-eval with PT    Diagnosis: Lumbar disc disease with radiculopathy       Past Medical History   Diagnosis Date    Chronic back pain 2/17/2017    Depression     Depression 2/17/2017    Diabetes mellitus     HLD (hyperlipidemia) 2/17/2017    HTN (hypertension) 2/17/2017    Hypertension     Type 2 diabetes mellitus with diabetic neuropathy 2/17/2017      Past Surgical History   Procedure Laterality Date    Hand surgery         Referring physician:  Verbal order from Dr. Reinoso 02/18/17 @ 7:29am (original order per YUSUF Lim PA-C 02/17/17)  Date referred to OT: 02/18/17    General Precautions: Standard, fall  Orthopedic Precautions: N/A  Braces: N/A      Patient History:  Living Environment  Lives With: alone  Living Arrangements: house  Living Environment Comment:  (Pt lives alone in [facility] with 1 step to enter and tub/shower combo for bathing - pt was I with ADLs and mobility up and working full time until 3 weeks ago - son stayed with him (form out of town) up until a few days ago)  Equipment Currently Used at Home: walker, rolling, cane, straight    Prior level of function:   Bed Mobility/Transfers: independent  Grooming: independent  Bathing: independent  Upper Body Dressing: independent  Lower Body Dressing: independent  Toileting: independent  Home Management Skills: independent  Driving License: Yes  Mode of Transportation: Car  Occupation: Full time employment  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  "Let me explain my pain like this.  I had kidney stones before this.  I can handle that better then what I feel now."  Chief " Complaint: pain in back and R leg  Patient/Family stated goals: decrease pain and regain independence    Pain Ratin/10 (when laying down - 10 with movement)  Location - Side: Right   Location: leg (also noted pain in R shoulder during MMT and L LE during MMT)  Pain Addressed: Reposition, Distraction, Cessation of Activity  Pain Rating Post-Intervention: 10/10    Objective:  Patient found with: peripheral IV    Cognitive Exam:  Oriented to: Person, Place, Time and Situation  Follows Commands/attention: Easily distracted by pain, Follows multistep  commands  Communication: clear/fluent  Memory:  No Deficits noted  Safety awareness/insight to disability: impaired  Coping skills/emotional control: difficulty with pain management    Visual/perceptual:  Wears glasses    Physical Exam:  Postural examination/scapula alignment: No postural abnormalities identified and pt remained in bed  Skin integrity: Visible skin intact  Edema: Mild B LEs    Sensation:   Intact    Upper Extremity Range of Motion:  Right Upper Extremity: WFL  Left Upper Extremity: WFL    Upper Extremity Strength:  Right Upper Extremity: WFL  Left Upper Extremity: WFL   Strength: good  Pain with MMT on R shoulder with resisted flexion    Fine motor coordination:   Intact    Gross motor coordination: WFL    Functional Mobility:  Bed Mobility:  Rolling/Turning to Left: Dependent  Rolling/Turning Right: Dependent  Scooting/Bridging: Dependent, With assist of 2  Supine to Sit: Dependent (pt could not complete attempted supine to sit due to pain)    Transfers:  Sit <> Stand Assistance: Activity did not occur    ** Pt with increased pain during assessment.  Cried out with any attempted movement.  Pt attempted to move B LEs off of bed to sit on EOB but unable to due to pain after prolonged time with attempt.  Pt unable to complete any bed mobility or transfers during assessment due to pain.     Activities of Daily Living:  Feeding Level of Assistance:  "Set-up Assistance to drink from cup    UE Dressing Level of Assistance: Set-up Assistance (hospital gown in supine)    LE Dressing Level of Assistance: Total assistance (unable to sharan socks)    Grooming Position: other (supine in bed)  Grooming Level of Assistance: Supervision (wash face)       Balance:   Balance not assessed - pt remained supine for evaluation.     Therapeutic Activities and Exercises:  · ADLs and func mobility performed as noted above for evaluation.   · Pt educated on alternate techniques for LB dressing to decrease pain - pt unable to follow through at this time.   · Pt educated on role of OT and OT POC    AM-PAC 6 CLICK ADL  How much help from another person does this patient currently need?  1 = Unable, Total/Dependent Assistance  2 = A lot, Maximum/Moderate Assistance  3 = A little, Minimum/Contact Guard/Supervision  4 = None, Modified [place]/Independent    Putting on and taking off regular lower body clothing? : 1  Bathing (including washing, rinsing, drying)?: 2  Toileting, which includes using toilet, bedpan, or urinal? : 2  Putting on and taking off regular upper body clothing?: 3  Taking care of personal grooming such as brushing teeth?: 3  Eating meals?: 3  Total Score: 14    AM-PAC Raw Score CMS "G-Code Modifier Level of Impairment Assistance   6 % Total / Unable   7 - 9 CM 80 - 100% Maximal Assist   10 - 14 CL 60 - 80% Moderate Assist   15 - 19 CK 40 - 60% Moderate Assist   20 - 22 CJ 20 - 40% Minimal Assist   23 CI 1-20% SBA / CGA   24 CH 0% Independent/ Mod I       Patient left HOB elevated with call button in reach    Assessment:  Luis Bryant is a 61 y.o. male with a medical diagnosis of Lumbar disc disease with radiculopathy and presents with  increased pain, LE edema, decreased func mobility and decreased self care skills.  Pt's pain was limiiting in that pt was unable to perform LB dressing or any functional mobility (bed mobility or transfers) as a result of it.  " Pt lives alone and is unsure of how much assist he will have at discharge.  Pt lives alone but had assistance from out of town son for ~3 weeks (duration of when his level of function declined).  Pt's son has since returned back home to work.  Pt will continue to benefit from skilled OT services in order to improve his overall strength/endurance, self care skills and func mobility.  OT recommends SNF for post acute therapy in order to increase time and intensity of therapy in order to assist pt with pain management, strength/endurance and returning to PLOF.   Recommended equipment includes BSC and TTB (pt already has RW and cane).      Rehab identified problem list/impairments: Rehab identified problem list/impairments: weakness, impaired endurance, impaired self care skills, impaired functional mobilty, pain, gait instability, impaired balance, decreased upper extremity function, decreased lower extremity function, decreased safety awareness, impaired coordination, impaired skin, edema    Rehab potential is fair.    Activity tolerance: Poor due to increased pain    Discharge recommendations: Discharge Facility/Level Of Care Needs: nursing facility, skilled     Barriers to discharge: Barriers to Discharge: Inaccessible home environment, Decreased caregiver support    Equipment recommendations: bedside commode, bath bench     GOALS:   Occupational Therapy Goals        Problem: Occupational Therapy Goal    Goal Priority Disciplines Outcome Interventions   Occupational Therapy Goal     OT, PT/OT Ongoing (interventions implemented as appropriate)    Description:  Goals to be met by: 03/03/17     Patient will increase functional independence with ADLs by performing:    LE Dressing with Moderate Assistance.  Grooming while EOB with Set-up Assistance.  Toileting from bedside commode with Moderate Assistance for hygiene and clothing management.   Rolling to Bilateral with Minimal Assistance.   Supine to sit with Moderate  Assistance.  Toilet transfer to bedside commode with Moderate Assistance.  Upper extremity exercise program x10 reps per handout, with supervision.                PLAN:  Patient to be seen 5 x/week to address the above listed problems via self-care/home management, therapeutic activities, therapeutic exercises  Plan of Care expires: 03/18/17  Plan of Care reviewed with: patient         Dinorah ROCHA Delfina, OT  02/18/2017

## 2017-02-18 NOTE — ASSESSMENT & PLAN NOTE
MRI Lumbar and Thoracic Spine shows moderate foraminal stenoses at L5-S1 related to bilateral facet arthropathy with fluid in the facet joints and a mild circumferential disc bulge.  Pt with one time episode of bladder incontinence however pt reports episode occurred b/c he could not make it to the bathroom due to pain.  Discussed MRI and case with Neurosurgery.  Neurosurgery declined consult at this time and recommended measuring post void residual.  If residual >100 recommend starting flomax.  Called RN to remind of post void residual order.  Will continue current home pain regimen:  Ibuprofen 200 mg q 6 hrs prn, tramadol 50 mg q 6 hrs prn.  PT/OT consulted.

## 2017-02-18 NOTE — PLAN OF CARE
Problem: Physical Therapy Goal  Goal: Physical Therapy Goal  Goals to be met by: 3/4/17     Patient will increase functional independence with mobility by performin. Supine to sit with MInimal Assistance.  2. Sit to supine with MInimal Assistance.  3. Rolling to Left and Right with Minimal Assistance.  4. Sit to stand transfer with Minimal Assistance.  5. Bed to chair transfer with Minimal Assistance using Rolling Walker.  6. Gait x 120 feet with Minimal Assistance using Rolling Walker.   6. Ascend/descend 1 stair with bilateral Handrails Minimal Assistance.   7. Sitting at edge of bed x 10 minutes with Supervision.  8. Stand for 10 minutes with Laurent using Rolling Walker.  9. Lower extremity exercise program x 20 reps per handout, with assistance as needed.  Outcome: Ongoing (interventions implemented as appropriate)  PT goals established.
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
## 2017-02-18 NOTE — PLAN OF CARE
Problem: Occupational Therapy Goal  Goal: Occupational Therapy Goal  Goals to be met by: 03/03/17     Patient will increase functional independence with ADLs by performing:    LE Dressing with Moderate Assistance.  Grooming while EOB with Set-up Assistance.  Toileting from bedside commode with Moderate Assistance for hygiene and clothing management.   Rolling to Bilateral with Minimal Assistance.   Supine to sit with Moderate Assistance.  Toilet transfer to bedside commode with Moderate Assistance.  Upper extremity exercise program x10 reps per handout, with supervision.  Outcome: Ongoing (interventions implemented as appropriate)  Pt was seen by OT/PT for evaluation.  Pt was noted with increased pain, LE edema, decreased func mobility and decreased self care skills.  Pt's pain was limiiting in that pt was unable to perform LB dressing or any functional mobility (bed mobility or transfers) as a result of it.  Pt lives alone and is unsure of how much assist he will have at discharge.  Pt lives alone but had assistance from out of town son for ~3 weeks (duration of when his level of function declined).  Pt's son has since returned back home to work.  Pt will continue to benefit from skilled OT services in order to improve his overall strength/endurance, self care skills and func mobility.  OT recommends SNF for post acute therapy in order to increase time and intensity of therapy in order to assist pt with pain management, strength/endurance and returning to PLOF.   Recommended equipment includes BSC and TTB (pt already has RW and cane).     Dinorah Mathis, OT  2/18/2017

## 2017-02-18 NOTE — SUBJECTIVE & OBJECTIVE
Interval History: no acute events overnight; pt reports back pain has improved however limited participation with PT today due to pain    Review of Systems   Constitutional: Positive for activity change and appetite change. Negative for chills, diaphoresis, fatigue and unexpected weight change.   HENT: Negative for congestion, facial swelling, trouble swallowing and voice change.    Eyes: Negative for photophobia, pain, redness and visual disturbance.   Respiratory: Negative for choking, chest tightness, shortness of breath and wheezing.    Cardiovascular: Negative for chest pain, palpitations and leg swelling.   Gastrointestinal: Negative for abdominal pain, constipation, diarrhea, nausea and vomiting.   Endocrine: Negative for cold intolerance, heat intolerance, polydipsia and polyuria.   Genitourinary: Negative for flank pain, frequency, hematuria and urgency.   Musculoskeletal: Positive for arthralgias, back pain and gait problem. Negative for myalgias, neck pain and neck stiffness.   Skin: Negative for color change, pallor, rash and wound.   Neurological: Positive for numbness. Negative for dizziness, tremors, syncope, facial asymmetry, speech difficulty, weakness and headaches.   Hematological: Negative for adenopathy. Does not bruise/bleed easily.   Psychiatric/Behavioral: Positive for dysphoric mood and sleep disturbance. Negative for agitation, confusion and suicidal ideas.     Objective:     Vital Signs (Most Recent):  Temp: 98.1 °F (36.7 °C) (02/18/17 0800)  Pulse: 65 (02/18/17 0800)  Resp: 18 (02/18/17 0800)  BP: (!) 107/58 (02/18/17 0800)  SpO2: 96 % (02/18/17 0800) Vital Signs (24h Range):  Temp:  [97.4 °F (36.3 °C)-98.1 °F (36.7 °C)] 98.1 °F (36.7 °C)  Pulse:  [60-67] 65  Resp:  [14-20] 18  SpO2:  [96 %-99 %] 96 %  BP: (107-148)/(58-76) 107/58     Weight: 95.3 kg (210 lb)  Body mass index is 29.29 kg/(m^2).  No intake or output data in the 24 hours ending 02/18/17 1110   Physical Exam    Constitutional: He is oriented to person, place, and time. He appears well-developed and well-nourished.   HENT:   Head: Normocephalic and atraumatic.   Eyes: EOM are normal. Pupils are equal, round, and reactive to light.   Neck: Normal range of motion. Neck supple. No tracheal deviation present. No thyromegaly present.   Cardiovascular: Normal rate and regular rhythm.    No murmur heard.  Pulmonary/Chest: Effort normal and breath sounds normal. He has no wheezes.   Abdominal: Soft. Bowel sounds are normal. There is no tenderness.   Musculoskeletal: He exhibits no edema.        Right hip: He exhibits decreased range of motion.        Lumbar back: He exhibits tenderness.   Lymphadenopathy:     He has no cervical adenopathy.   Neurological: He is alert and oriented to person, place, and time. He has normal reflexes. No cranial nerve deficit or sensory deficit. GCS eye subscore is 4. GCS verbal subscore is 5. GCS motor subscore is 6.   Skin: Skin is warm and dry.   Psychiatric: He has a normal mood and affect. His behavior is normal.   Nursing note and vitals reviewed.      Significant Labs: All pertinent labs within the past 24 hours have been reviewed.    Significant Imaging: I have reviewed all pertinent imaging results/findings within the past 24 hours.

## 2017-02-18 NOTE — NURSING
Pt arrived to OBS 1 via stretcher. Pt very apprehensive about being transferred from the stretcher to bed. Pt states he has pain when moved. Required 3 people to transfer over to bed. Peripheral IV saline locked. Pt oriented to room with call bell within reach, bed in lowest position, and wheels locked. Will continue to monitor closely.

## 2017-02-18 NOTE — PLAN OF CARE
Problem: Patient Care Overview  Goal: Plan of Care Review  Pt with no falls or injuries this shift. Pt with no s/s hypo or hyperglycemia this shift. Pt skin intact.

## 2017-02-18 NOTE — PLAN OF CARE
Problem: Patient Care Overview  Goal: Plan of Care Review  Outcome: Ongoing (interventions implemented as appropriate)  poc updated. Pt free from falls. Safety maintained. Diabetic teaching provided. No distress noted.

## 2017-02-19 LAB
ANION GAP SERPL CALC-SCNC: 7 MMOL/L
BASOPHILS # BLD AUTO: 0.02 K/UL
BASOPHILS NFR BLD: 0.6 %
BUN SERPL-MCNC: 24 MG/DL
CALCIUM SERPL-MCNC: 9 MG/DL
CHLORIDE SERPL-SCNC: 103 MMOL/L
CO2 SERPL-SCNC: 26 MMOL/L
CREAT SERPL-MCNC: 1.2 MG/DL
DIFFERENTIAL METHOD: ABNORMAL
EOSINOPHIL # BLD AUTO: 0.2 K/UL
EOSINOPHIL NFR BLD: 5.1 %
ERYTHROCYTE [DISTWIDTH] IN BLOOD BY AUTOMATED COUNT: 12.4 %
EST. GFR  (AFRICAN AMERICAN): >60 ML/MIN/1.73 M^2
EST. GFR  (NON AFRICAN AMERICAN): >60 ML/MIN/1.73 M^2
GLUCOSE SERPL-MCNC: 90 MG/DL
HCT VFR BLD AUTO: 31.7 %
HGB BLD-MCNC: 10.8 G/DL
LYMPHOCYTES # BLD AUTO: 1.2 K/UL
LYMPHOCYTES NFR BLD: 36.3 %
MCH RBC QN AUTO: 28.6 PG
MCHC RBC AUTO-ENTMCNC: 34.1 %
MCV RBC AUTO: 84 FL
MONOCYTES # BLD AUTO: 0.6 K/UL
MONOCYTES NFR BLD: 17.5 %
NEUTROPHILS # BLD AUTO: 1.3 K/UL
NEUTROPHILS NFR BLD: 40.2 %
PLATELET # BLD AUTO: 259 K/UL
PMV BLD AUTO: 9 FL
POCT GLUCOSE: 112 MG/DL (ref 70–110)
POCT GLUCOSE: 135 MG/DL (ref 70–110)
POCT GLUCOSE: 151 MG/DL (ref 70–110)
POCT GLUCOSE: 88 MG/DL (ref 70–110)
POTASSIUM SERPL-SCNC: 4 MMOL/L
RBC # BLD AUTO: 3.78 M/UL
SODIUM SERPL-SCNC: 136 MMOL/L
WBC # BLD AUTO: 3.31 K/UL

## 2017-02-19 PROCEDURE — 63600175 PHARM REV CODE 636 W HCPCS: Performed by: PHYSICIAN ASSISTANT

## 2017-02-19 PROCEDURE — G0378 HOSPITAL OBSERVATION PER HR: HCPCS

## 2017-02-19 PROCEDURE — 80048 BASIC METABOLIC PNL TOTAL CA: CPT

## 2017-02-19 PROCEDURE — 25000003 PHARM REV CODE 250: Performed by: PHYSICIAN ASSISTANT

## 2017-02-19 PROCEDURE — 82962 GLUCOSE BLOOD TEST: CPT

## 2017-02-19 PROCEDURE — 99226 PR SUBSEQUENT OBSERVATION CARE,LEVEL III: CPT | Mod: ,,, | Performed by: PHYSICIAN ASSISTANT

## 2017-02-19 PROCEDURE — 36415 COLL VENOUS BLD VENIPUNCTURE: CPT

## 2017-02-19 PROCEDURE — 85025 COMPLETE CBC W/AUTO DIFF WBC: CPT

## 2017-02-19 RX ADMIN — BUPROPION HYDROCHLORIDE 100 MG: 100 TABLET, FILM COATED ORAL at 09:02

## 2017-02-19 RX ADMIN — ENOXAPARIN SODIUM 40 MG: 100 INJECTION SUBCUTANEOUS at 12:02

## 2017-02-19 RX ADMIN — METHOCARBAMOL 1000 MG: 500 TABLET ORAL at 05:02

## 2017-02-19 RX ADMIN — METHOCARBAMOL 1000 MG: 500 TABLET ORAL at 09:02

## 2017-02-19 RX ADMIN — METHOCARBAMOL 1000 MG: 500 TABLET ORAL at 02:02

## 2017-02-19 RX ADMIN — TRAMADOL HYDROCHLORIDE 50 MG: 50 TABLET, FILM COATED ORAL at 02:02

## 2017-02-19 RX ADMIN — PRAVASTATIN SODIUM 40 MG: 40 TABLET ORAL at 09:02

## 2017-02-19 RX ADMIN — GABAPENTIN 400 MG: 400 CAPSULE ORAL at 09:02

## 2017-02-19 RX ADMIN — TRAZODONE HYDROCHLORIDE 50 MG: 50 TABLET ORAL at 09:02

## 2017-02-19 RX ADMIN — ATENOLOL 25 MG: 25 TABLET ORAL at 08:02

## 2017-02-19 RX ADMIN — TRAMADOL HYDROCHLORIDE 50 MG: 50 TABLET, FILM COATED ORAL at 09:02

## 2017-02-19 RX ADMIN — BUPROPION HYDROCHLORIDE 100 MG: 100 TABLET, FILM COATED ORAL at 08:02

## 2017-02-19 RX ADMIN — GABAPENTIN 400 MG: 400 CAPSULE ORAL at 05:02

## 2017-02-19 RX ADMIN — VENLAFAXINE HYDROCHLORIDE 150 MG: 150 CAPSULE, EXTENDED RELEASE ORAL at 08:02

## 2017-02-19 RX ADMIN — GABAPENTIN 400 MG: 400 CAPSULE ORAL at 02:02

## 2017-02-19 RX ADMIN — SODIUM CHLORIDE 500 ML: 0.9 INJECTION, SOLUTION INTRAVENOUS at 10:02

## 2017-02-19 RX ADMIN — TRAMADOL HYDROCHLORIDE 50 MG: 50 TABLET, FILM COATED ORAL at 08:02

## 2017-02-19 RX ADMIN — ERYTHROMYCIN 1 INCH: 5 OINTMENT OPHTHALMIC at 09:02

## 2017-02-19 NOTE — ASSESSMENT & PLAN NOTE
MRI Lumbar and Thoracic Spine shows moderate foraminal stenoses at L5-S1 related to bilateral facet arthropathy with fluid in the facet joints and a mild circumferential disc bulge.  Pt with one time episode of bladder incontinence however pt reports episode occurred b/c he could not make it to the bathroom due to pain.  Discussed MRI and case with Neurosurgery.  Neurosurgery declined consult at this time and recommended measuring post void residual.  If residual >100 recommend starting flomax.  Called RN to remind of post void residual order.  Will continue current home pain regimen:  Ibuprofen 200 mg q 6 hrs prn, tramadol 50 mg q 6 hrs prn.  PT/OT consulted and recommending SNF.

## 2017-02-19 NOTE — SUBJECTIVE & OBJECTIVE
Interval History: no acute events overnight; no new complaints.  Pt sitting up in chair.  Back pain with continued but slow improvement.  Discussed with pt PT/OT recommending SNF however I will need to discuss with team tomorrow.    Review of Systems   Constitutional: Positive for activity change and appetite change. Negative for chills, diaphoresis, fatigue and unexpected weight change.   HENT: Negative for congestion, facial swelling, trouble swallowing and voice change.    Respiratory: Negative for shortness of breath and wheezing.    Cardiovascular: Negative for chest pain, palpitations and leg swelling.   Gastrointestinal: Negative for abdominal pain, constipation, diarrhea, nausea and vomiting.   Endocrine: Negative for cold intolerance, heat intolerance, polydipsia and polyuria.   Genitourinary: Negative for flank pain, frequency, hematuria and urgency.   Musculoskeletal: Positive for arthralgias, back pain and gait problem. Negative for myalgias, neck pain and neck stiffness.   Skin: Negative for color change, pallor, rash and wound.   Neurological: Positive for numbness. Negative for dizziness, tremors, syncope, facial asymmetry, speech difficulty, weakness and headaches.   Psychiatric/Behavioral: Positive for sleep disturbance. Negative for agitation, confusion, dysphoric mood and suicidal ideas.     Objective:     Vital Signs (Most Recent):  Temp: 98.4 °F (36.9 °C) (02/19/17 0800)  Pulse: 68 (02/19/17 0838)  Resp: 18 (02/19/17 0800)  BP: 126/71 (02/19/17 0838)  SpO2: 96 % (02/19/17 0800) Vital Signs (24h Range):  Temp:  [98 °F (36.7 °C)-98.7 °F (37.1 °C)] 98.4 °F (36.9 °C)  Pulse:  [59-75] 68  Resp:  [16-18] 18  SpO2:  [95 %-97 %] 96 %  BP: (118-134)/(61-73) 126/71     Weight: 95.3 kg (210 lb)  Body mass index is 29.29 kg/(m^2).    Intake/Output Summary (Last 24 hours) at 02/19/17 1134  Last data filed at 02/19/17 0500   Gross per 24 hour   Intake              500 ml   Output             1100 ml   Net              -600 ml      Physical Exam   Constitutional: He is oriented to person, place, and time. He appears well-developed and well-nourished.   HENT:   Head: Normocephalic and atraumatic.   Eyes: EOM are normal. Pupils are equal, round, and reactive to light.   Neck: Normal range of motion. Neck supple.   Cardiovascular: Normal rate and regular rhythm.    Pulmonary/Chest: Effort normal and breath sounds normal.   Abdominal: Soft. Bowel sounds are normal.   Musculoskeletal: He exhibits no edema.        Right hip: He exhibits decreased range of motion.        Lumbar back: He exhibits tenderness.   Neurological: He is alert and oriented to person, place, and time. He has normal reflexes. No cranial nerve deficit or sensory deficit. GCS eye subscore is 4. GCS verbal subscore is 5. GCS motor subscore is 6.   Skin: Skin is warm and dry.   Psychiatric: He has a normal mood and affect. His behavior is normal.   Nursing note and vitals reviewed.      Significant Labs: All pertinent labs within the past 24 hours have been reviewed.    Significant Imaging: I have reviewed all pertinent imaging results/findings within the past 24 hours.

## 2017-02-19 NOTE — PLAN OF CARE
Problem: Patient Care Overview  Goal: Plan of Care Review  Outcome: Ongoing (interventions implemented as appropriate)  patient with moderate back pain with spasms upon movement. Assist patient in turning qr hors with wedge. Shifting weight when needed. Pain medication regimen reviewed with patient. Bed is low and locked. Side rails up x2. Call bell within reach. Will  Monitor.

## 2017-02-19 NOTE — ASSESSMENT & PLAN NOTE
Pt denies SI.  Mood has improved over the course of his stay.  Pt stopped taking wellbutrin prior to admission for several weeks  [facility].  Resume home medications.
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
## 2017-02-20 LAB
ANION GAP SERPL CALC-SCNC: 7 MMOL/L
BASOPHILS # BLD AUTO: 0.02 K/UL
BASOPHILS NFR BLD: 0.5 %
BUN SERPL-MCNC: 19 MG/DL
CALCIUM SERPL-MCNC: 9.1 MG/DL
CHLORIDE SERPL-SCNC: 103 MMOL/L
CO2 SERPL-SCNC: 25 MMOL/L
CREAT SERPL-MCNC: 1.1 MG/DL
DIFFERENTIAL METHOD: ABNORMAL
EOSINOPHIL # BLD AUTO: 0.2 K/UL
EOSINOPHIL NFR BLD: 5.2 %
ERYTHROCYTE [DISTWIDTH] IN BLOOD BY AUTOMATED COUNT: 12.4 %
EST. GFR  (AFRICAN AMERICAN): >60 ML/MIN/1.73 M^2
EST. GFR  (NON AFRICAN AMERICAN): >60 ML/MIN/1.73 M^2
GLUCOSE SERPL-MCNC: 100 MG/DL
HCT VFR BLD AUTO: 32.2 %
HGB BLD-MCNC: 10.9 G/DL
LYMPHOCYTES # BLD AUTO: 1.3 K/UL
LYMPHOCYTES NFR BLD: 30 %
MCH RBC QN AUTO: 28.4 PG
MCHC RBC AUTO-ENTMCNC: 33.9 %
MCV RBC AUTO: 84 FL
MONOCYTES # BLD AUTO: 0.9 K/UL
MONOCYTES NFR BLD: 21.1 %
NEUTROPHILS # BLD AUTO: 1.8 K/UL
NEUTROPHILS NFR BLD: 43 %
PLATELET # BLD AUTO: 254 K/UL
PMV BLD AUTO: 9.2 FL
POCT GLUCOSE: 142 MG/DL (ref 70–110)
POCT GLUCOSE: 174 MG/DL (ref 70–110)
POCT GLUCOSE: 189 MG/DL (ref 70–110)
POCT GLUCOSE: 99 MG/DL (ref 70–110)
POTASSIUM SERPL-SCNC: 4.2 MMOL/L
RBC # BLD AUTO: 3.84 M/UL
SODIUM SERPL-SCNC: 135 MMOL/L
WBC # BLD AUTO: 4.26 K/UL

## 2017-02-20 PROCEDURE — 82962 GLUCOSE BLOOD TEST: CPT

## 2017-02-20 PROCEDURE — G0378 HOSPITAL OBSERVATION PER HR: HCPCS

## 2017-02-20 PROCEDURE — 36415 COLL VENOUS BLD VENIPUNCTURE: CPT

## 2017-02-20 PROCEDURE — 63600175 PHARM REV CODE 636 W HCPCS: Performed by: PHYSICIAN ASSISTANT

## 2017-02-20 PROCEDURE — 85025 COMPLETE CBC W/AUTO DIFF WBC: CPT

## 2017-02-20 PROCEDURE — 80048 BASIC METABOLIC PNL TOTAL CA: CPT

## 2017-02-20 PROCEDURE — 99226 PR SUBSEQUENT OBSERVATION CARE,LEVEL III: CPT | Mod: ,,, | Performed by: PHYSICIAN ASSISTANT

## 2017-02-20 PROCEDURE — 97116 GAIT TRAINING THERAPY: CPT

## 2017-02-20 PROCEDURE — 25000003 PHARM REV CODE 250: Performed by: PHYSICIAN ASSISTANT

## 2017-02-20 PROCEDURE — 97530 THERAPEUTIC ACTIVITIES: CPT

## 2017-02-20 RX ORDER — DOCUSATE SODIUM 100 MG/1
100 CAPSULE, LIQUID FILLED ORAL 2 TIMES DAILY
Status: DISCONTINUED | OUTPATIENT
Start: 2017-02-20 | End: 2017-02-23 | Stop reason: HOSPADM

## 2017-02-20 RX ORDER — VALSARTAN 160 MG/1
160 TABLET ORAL DAILY
Status: DISCONTINUED | OUTPATIENT
Start: 2017-02-20 | End: 2017-02-23 | Stop reason: HOSPADM

## 2017-02-20 RX ORDER — GABAPENTIN 300 MG/1
600 CAPSULE ORAL 3 TIMES DAILY
Status: DISCONTINUED | OUTPATIENT
Start: 2017-02-20 | End: 2017-02-23 | Stop reason: HOSPADM

## 2017-02-20 RX ADMIN — VALSARTAN 160 MG: 160 TABLET, FILM COATED ORAL at 08:02

## 2017-02-20 RX ADMIN — VENLAFAXINE HYDROCHLORIDE 150 MG: 150 CAPSULE, EXTENDED RELEASE ORAL at 08:02

## 2017-02-20 RX ADMIN — METHOCARBAMOL 1000 MG: 500 TABLET ORAL at 09:02

## 2017-02-20 RX ADMIN — GABAPENTIN 600 MG: 300 CAPSULE ORAL at 09:02

## 2017-02-20 RX ADMIN — ATENOLOL 25 MG: 25 TABLET ORAL at 08:02

## 2017-02-20 RX ADMIN — GABAPENTIN 400 MG: 400 CAPSULE ORAL at 05:02

## 2017-02-20 RX ADMIN — METHOCARBAMOL 1000 MG: 500 TABLET ORAL at 05:02

## 2017-02-20 RX ADMIN — BUPROPION HYDROCHLORIDE 100 MG: 100 TABLET, FILM COATED ORAL at 08:02

## 2017-02-20 RX ADMIN — PRAVASTATIN SODIUM 40 MG: 40 TABLET ORAL at 09:02

## 2017-02-20 RX ADMIN — GABAPENTIN 600 MG: 300 CAPSULE ORAL at 02:02

## 2017-02-20 RX ADMIN — ERYTHROMYCIN 1 INCH: 5 OINTMENT OPHTHALMIC at 09:02

## 2017-02-20 RX ADMIN — ENOXAPARIN SODIUM 40 MG: 100 INJECTION SUBCUTANEOUS at 02:02

## 2017-02-20 RX ADMIN — METHOCARBAMOL 1000 MG: 500 TABLET ORAL at 02:02

## 2017-02-20 RX ADMIN — TRAZODONE HYDROCHLORIDE 50 MG: 50 TABLET ORAL at 09:02

## 2017-02-20 RX ADMIN — BUPROPION HYDROCHLORIDE 100 MG: 100 TABLET, FILM COATED ORAL at 09:02

## 2017-02-20 NOTE — PLAN OF CARE
Problem: Patient Care Overview  Goal: Plan of Care Review  Outcome: Ongoing (interventions implemented as appropriate)  poc updated. Diabetic teaching provided. No distress noted. Pt free from falls. Safety maintained. Assist with adls.

## 2017-02-20 NOTE — PLAN OF CARE
Problem: Patient Care Overview  Goal: Plan of Care Review  Pt with no falls or injuries this shift. Pt with no s/s hypo or hyperglycemia. Pt with no skin breakdown

## 2017-02-20 NOTE — PLAN OF CARE
02/20/17 1529   Discharge Assessment   Assessment Type Discharge Planning Assessment   Confirmed/corrected address and phone number on facesheet? Yes   Assessment information obtained from? Patient   Expected Length of Stay (days) 2   Communicated expected length of stay with patient/caregiver yes   Prior to hospitilization cognitive status: Alert/Oriented   Prior to hospitalization functional status: Needs Assistance;Assistive Equipment   Current cognitive status: Alert/Oriented   Current Functional Status: Assistive Equipment;Needs Assistance   Arrived From home or self-care   Lives With alone   Able to Return to Prior Arrangements no   Is patient able to care for self after discharge? No   Patient's perception of discharge disposition skilled nursing facility   Readmission Within The Last 30 Days no previous admission in last 30 days   Patient currently being followed by outpatient case management? No   Patient currently receives home health services? No   Does the patient currently use HME? Yes   Patient currently receives private duty nursing? No   Patient currently receives any other outside agency services? No   Equipment Currently Used at Home walker, rolling;cane, straight   Do you have any problems affording any of your prescribed medications? No   Is the patient taking medications as prescribed? yes   On Dialysis? No   Are there any open cases? No   Discharge Plan A Skilled Nursing Facility   Discharge Plan B Home Health   Patient/Family In Agreement With Plan yes   Called Kerri at [facility] and patient does not have snf benefits with [facility]. Met with patient and he states that [facility]. [facility].
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
## 2017-02-20 NOTE — ASSESSMENT & PLAN NOTE
Continue gabapentin.  Pt has rx bottle with him, gabapentin 400 mg tid.  Will increase gabapentin to 600 mg tid on 2/20.  Hold oral medications.  Hyper/hypoglycemia protocols in place.  Diabetic diet.

## 2017-02-20 NOTE — PLAN OF CARE
Problem: Patient Care Overview  Goal: Plan of Care Review  Outcome: Ongoing (interventions implemented as appropriate)  Plan of care reviewed with patient. No distress noted at this time. Pt sitting up in chair. Pt denies pain at this time. Fall precautions maintained with chair locked and call bell within reach. Blood glucose monitoring in place. Will continue to monitor closely.

## 2017-02-20 NOTE — SUBJECTIVE & OBJECTIVE
Interval History: no acute events overnight; no new complaints.  Neuropathic pain still persists.    Review of Systems   Constitutional: Positive for activity change and appetite change. Negative for chills, diaphoresis, fatigue and unexpected weight change.   HENT: Negative for congestion, facial swelling, trouble swallowing and voice change.    Respiratory: Negative for shortness of breath and wheezing.    Cardiovascular: Negative for chest pain, palpitations and leg swelling.   Gastrointestinal: Negative for abdominal pain, constipation, diarrhea, nausea and vomiting.   Endocrine: Negative for cold intolerance, heat intolerance, polydipsia and polyuria.   Genitourinary: Negative for flank pain, frequency, hematuria and urgency.   Musculoskeletal: Positive for arthralgias, back pain and gait problem. Negative for myalgias, neck pain and neck stiffness.   Skin: Negative for color change, pallor, rash and wound.   Neurological: Positive for numbness. Negative for dizziness, tremors, syncope, facial asymmetry, speech difficulty, weakness and headaches.   Psychiatric/Behavioral: Positive for sleep disturbance. Negative for agitation, confusion, dysphoric mood and suicidal ideas.     Objective:     Vital Signs (Most Recent):  Temp: 98.8 °F (37.1 °C) (02/20/17 0800)  Pulse: 68 (02/20/17 0800)  Resp: 18 (02/20/17 0800)  BP: 124/63 (02/20/17 0800)  SpO2: 97 % (02/20/17 0800) Vital Signs (24h Range):  Temp:  [98.2 °F (36.8 °C)-98.8 °F (37.1 °C)] 98.8 °F (37.1 °C)  Pulse:  [63-70] 68  Resp:  [18] 18  SpO2:  [95 %-97 %] 97 %  BP: (124-150)/(63-81) 124/63     Weight: 95.3 kg (210 lb)  Body mass index is 29.29 kg/(m^2).    Intake/Output Summary (Last 24 hours) at 02/20/17 1308  Last data filed at 02/19/17 1600   Gross per 24 hour   Intake              360 ml   Output              300 ml   Net               60 ml      Physical Exam   Constitutional: He is oriented to person, place, and time. He appears well-developed and  well-nourished.   HENT:   Head: Normocephalic and atraumatic.   Eyes: EOM are normal. Pupils are equal, round, and reactive to light.   Neck: Normal range of motion. Neck supple.   Cardiovascular: Normal rate and regular rhythm.    Pulmonary/Chest: Effort normal and breath sounds normal.   Abdominal: Soft. Bowel sounds are normal.   Musculoskeletal: He exhibits no edema.        Right hip: He exhibits decreased range of motion.        Lumbar back: He exhibits tenderness.   Neurological: He is alert and oriented to person, place, and time. He has normal reflexes. No cranial nerve deficit or sensory deficit. GCS eye subscore is 4. GCS verbal subscore is 5. GCS motor subscore is 6.   Skin: Skin is warm and dry.   Psychiatric: He has a normal mood and affect. His behavior is normal.   Nursing note and vitals reviewed.      Significant Labs: All pertinent labs within the past 24 hours have been reviewed.    Significant Imaging: I have reviewed all pertinent imaging results/findings within the past 24 hours.

## 2017-02-21 LAB
ANION GAP SERPL CALC-SCNC: 8 MMOL/L
BASOPHILS # BLD AUTO: 0.03 K/UL
BASOPHILS NFR BLD: 0.6 %
BUN SERPL-MCNC: 17 MG/DL
CALCIUM SERPL-MCNC: 9.3 MG/DL
CHLORIDE SERPL-SCNC: 105 MMOL/L
CO2 SERPL-SCNC: 25 MMOL/L
CREAT SERPL-MCNC: 1.1 MG/DL
DIFFERENTIAL METHOD: ABNORMAL
EOSINOPHIL # BLD AUTO: 0.2 K/UL
EOSINOPHIL NFR BLD: 4.6 %
ERYTHROCYTE [DISTWIDTH] IN BLOOD BY AUTOMATED COUNT: 12.6 %
EST. GFR  (AFRICAN AMERICAN): >60 ML/MIN/1.73 M^2
EST. GFR  (NON AFRICAN AMERICAN): >60 ML/MIN/1.73 M^2
GLUCOSE SERPL-MCNC: 107 MG/DL
HCT VFR BLD AUTO: 31.8 %
HGB BLD-MCNC: 10.5 G/DL
LYMPHOCYTES # BLD AUTO: 1.6 K/UL
LYMPHOCYTES NFR BLD: 34.5 %
MCH RBC QN AUTO: 28.4 PG
MCHC RBC AUTO-ENTMCNC: 33 %
MCV RBC AUTO: 86 FL
MONOCYTES # BLD AUTO: 0.9 K/UL
MONOCYTES NFR BLD: 18.9 %
NEUTROPHILS # BLD AUTO: 2 K/UL
NEUTROPHILS NFR BLD: 41.2 %
PLATELET # BLD AUTO: 282 K/UL
PMV BLD AUTO: 9.7 FL
POCT GLUCOSE: 112 MG/DL (ref 70–110)
POCT GLUCOSE: 140 MG/DL (ref 70–110)
POCT GLUCOSE: 173 MG/DL (ref 70–110)
POCT GLUCOSE: 95 MG/DL (ref 70–110)
POTASSIUM SERPL-SCNC: 4.2 MMOL/L
RBC # BLD AUTO: 3.7 M/UL
SODIUM SERPL-SCNC: 138 MMOL/L
WBC # BLD AUTO: 4.76 K/UL

## 2017-02-21 PROCEDURE — 97530 THERAPEUTIC ACTIVITIES: CPT

## 2017-02-21 PROCEDURE — 82962 GLUCOSE BLOOD TEST: CPT

## 2017-02-21 PROCEDURE — 80048 BASIC METABOLIC PNL TOTAL CA: CPT

## 2017-02-21 PROCEDURE — 36415 COLL VENOUS BLD VENIPUNCTURE: CPT

## 2017-02-21 PROCEDURE — 25000003 PHARM REV CODE 250: Performed by: PHYSICIAN ASSISTANT

## 2017-02-21 PROCEDURE — 97110 THERAPEUTIC EXERCISES: CPT

## 2017-02-21 PROCEDURE — G8987 SELF CARE CURRENT STATUS: HCPCS | Mod: CJ

## 2017-02-21 PROCEDURE — 97116 GAIT TRAINING THERAPY: CPT

## 2017-02-21 PROCEDURE — G8988 SELF CARE GOAL STATUS: HCPCS | Mod: CI

## 2017-02-21 PROCEDURE — 99252 IP/OBS CONSLTJ NEW/EST SF 35: CPT | Mod: ,,, | Performed by: NURSE PRACTITIONER

## 2017-02-21 PROCEDURE — 63600175 PHARM REV CODE 636 W HCPCS: Performed by: PHYSICIAN ASSISTANT

## 2017-02-21 PROCEDURE — 85025 COMPLETE CBC W/AUTO DIFF WBC: CPT

## 2017-02-21 PROCEDURE — G0378 HOSPITAL OBSERVATION PER HR: HCPCS

## 2017-02-21 PROCEDURE — 99225 PR SUBSEQUENT OBSERVATION CARE,LEVEL II: CPT | Mod: ,,, | Performed by: PHYSICIAN ASSISTANT

## 2017-02-21 RX ADMIN — METHOCARBAMOL 1000 MG: 500 TABLET ORAL at 09:02

## 2017-02-21 RX ADMIN — METHOCARBAMOL 1000 MG: 500 TABLET ORAL at 01:02

## 2017-02-21 RX ADMIN — GABAPENTIN 600 MG: 300 CAPSULE ORAL at 05:02

## 2017-02-21 RX ADMIN — PRAVASTATIN SODIUM 40 MG: 40 TABLET ORAL at 09:02

## 2017-02-21 RX ADMIN — ERYTHROMYCIN 1 INCH: 5 OINTMENT OPHTHALMIC at 09:02

## 2017-02-21 RX ADMIN — BUPROPION HYDROCHLORIDE 100 MG: 100 TABLET, FILM COATED ORAL at 09:02

## 2017-02-21 RX ADMIN — METHOCARBAMOL 1000 MG: 500 TABLET ORAL at 05:02

## 2017-02-21 RX ADMIN — DOCUSATE SODIUM 100 MG: 100 CAPSULE, LIQUID FILLED ORAL at 08:02

## 2017-02-21 RX ADMIN — ENOXAPARIN SODIUM 40 MG: 100 INJECTION SUBCUTANEOUS at 01:02

## 2017-02-21 RX ADMIN — GABAPENTIN 600 MG: 300 CAPSULE ORAL at 09:02

## 2017-02-21 RX ADMIN — DOCUSATE SODIUM 100 MG: 100 CAPSULE, LIQUID FILLED ORAL at 09:02

## 2017-02-21 RX ADMIN — TRAZODONE HYDROCHLORIDE 50 MG: 50 TABLET ORAL at 09:02

## 2017-02-21 RX ADMIN — VALSARTAN 160 MG: 160 TABLET, FILM COATED ORAL at 08:02

## 2017-02-21 RX ADMIN — ATENOLOL 25 MG: 25 TABLET ORAL at 08:02

## 2017-02-21 RX ADMIN — VENLAFAXINE HYDROCHLORIDE 150 MG: 150 CAPSULE, EXTENDED RELEASE ORAL at 08:02

## 2017-02-21 RX ADMIN — BUPROPION HYDROCHLORIDE 100 MG: 100 TABLET, FILM COATED ORAL at 08:02

## 2017-02-21 RX ADMIN — GABAPENTIN 600 MG: 300 CAPSULE ORAL at 01:02

## 2017-02-21 NOTE — PLAN OF CARE
Problem: Physical Therapy Goal  Goal: Physical Therapy Goal  Goals to be met by: 3/4/17     Patient will increase functional independence with mobility by performin. Supine to sit with MInimal Assistance. - Met  2. Sit to supine with MInimal Assistance. - Not Met  3. Rolling to Left and Right with Minimal Assistance. - Met  4. Sit to stand transfer with Minimal Assistance. - Met  5. Bed to chair transfer with Minimal Assistance using Rolling Walker. - Met  6. Gait x 120 feet with Minimal Assistance using Rolling Walker. - Met   7. Ascend/descend 1 stair with bilateral Handrails Minimal Assistance. - Not Met   8. Sitting at edge of bed x 10 minutes with Supervision. - Not Met  9. Stand for 10 minutes with Laurent using Rolling Walker. - Not Met  10. Lower extremity exercise program x 20 reps per handout, with assistance as needed. - Not Met    Revised goals(in addition to unmet goals above) to be met by: 3/4/17     Patient will increase functional independence with mobility by performin. Supine to sit with Mod. Indep. - Not Met  3. Rolling to Left and Right with Mod. Indep. - Not Met  4. Sit to stand transfer with Mod. Indep. - Not Met  5. Bed to chair transfer with Mod. Indep. using Rolling Walker. - Not Met  6. Gait x 250 feet with Mod. Indep. using Rolling Walker. - Not Met        Outcome: Ongoing (interventions implemented as appropriate)  Five goals met and revised today

## 2017-02-21 NOTE — ASSESSMENT & PLAN NOTE
- Pt denies SI; mood has improved over the course of his stay  - Pt stopped taking wellbutrin prior to admission for several weeks  - Pt [facility] last visit 10/2016. Resumed home medications.  - Pt will follow up with psych as outpatient at [facility]

## 2017-02-21 NOTE — PT/OT/SLP PROGRESS
Physical Therapy  Treatment    Luis Bryant   MRN: [id number]   Admitting Diagnosis: Lumbar disc disease with radiculopathy    PT Received On: 02/21/17  PT Start Time: 0958     PT Stop Time: 1022    PT Total Time (min): 24 min       Billable Minutes:  Gait [id number] and Therapeutic Exercise 10    Treatment Type: Treatment  PT/PTA: PT     PTA Visit Number: 0       General Precautions: Standard, fall  Orthopedic Precautions: N/A   Braces:      Do you have any cultural, spiritual, Church conflicts, given your current situation?: none    Subjective:  Communicated with nursing prior to session.      Pain Rating:  (pt. did not rate)  Location - Side: Right  Location - Orientation: generalized  Location: leg  Pain Addressed: Reposition, Cessation of Activity       Objective:   Patient found with: peripheral IV    Functional Mobility:  Bed Mobility:   Rolling/Turning to Left: Stand by assistance  Scooting/Bridging: Stand by Assistance  Supine to Sit: Stand by Assistance, With side rail (required extra time)    Transfers:  Sit <> Stand Assistance: Stand By Assistance  Sit <> Stand Assistive Device: Rolling Walker  Bed <> Chair Technique: Stand Pivot  Bed <> Chair Assistance: Stand By Assistance  Bed <> Chair Assistive Device: Rolling Walker    Gait:   Gait Distance: 120'  Assistance 1: Stand by Assistance  Gait Assistive Device: Rolling walker  Gait Pattern: swing-to gait  Gait Deviation(s): decreased dom, decreased step length, decreased stride length (antalgic gait pattern)      Balance:   Static Sit: FAIR+: Able to take MINIMAL challenges from all directions  Dynamic Sit: FAIR+: Maintains balance through MINIMAL excursions of active trunk motion  Static Stand: FAIR+: Takes MINIMAL challenges from all directions  Dynamic stand: FAIR+: Needs CLOSE SUPERVISION during gait and is able to right self with minor LOB     Therapeutic Activities and Exercises:  Pt. performed (B) LE therex x15 reps seated in bedside chair with  AROM     AM-PAC 6 CLICK MOBILITY  How much help from another person does this patient currently need?   1 = Unable, Total/Dependent Assistance  2 = A lot, Maximum/Moderate Assistance  3 = A little, Minimum/Contact Guard/Supervision  4 = None, Modified [place]/Independent    Turning over in bed (including adjusting bedclothes, sheets and blankets)?: 3  Sitting down on and standing up from a chair with arms (e.g., wheelchair, bedside commode, etc.): 3  Moving from lying on back to sitting on the side of the bed?: 3  Moving to and from a bed to a chair (including a wheelchair)?: 3  Need to walk in hospital room?: 3  Climbing 3-5 steps with a railing?: 2  Total Score: 17    AM-PAC Raw Score CMS G-Code Modifier Level of Impairment Assistance   6 % Total / Unable   7 - 9 CM 80 - 100% Maximal Assist   10 - 14 CL 60 - 80% Moderate Assist   15 - 19 CK 40 - 60% Moderate Assist   20 - 22 CJ 20 - 40% Minimal Assist   23 CI 1-20% SBA / CGA   24 CH 0% Independent/ Mod I     Patient left up in chair with all lines intact and call button in reach.    Assessment:  Luis Bryant is a 61 y.o. male with a medical diagnosis of Lumbar disc disease with radiculopathy and presents with increased gait distance. Pt. cooperative and tolerated treatment well. Pt. progressing with mobility. Pt. would benefit from continued PT to address functional deficits.    Rehab identified problem list/impairments: Rehab identified problem list/impairments: weakness, impaired endurance, impaired self care skills, impaired functional mobilty, gait instability, impaired balance, pain    Rehab potential is good.    Activity tolerance: Good    Discharge recommendations: Discharge Facility/Level Of Care Needs: rehabilitation facility (pt. motivated to go to rehab) however, progressing towards home with HH PT    Barriers to discharge: Barriers to Discharge: Decreased caregiver support    Equipment recommendations: Equipment Needed After Discharge:   (TBD)     GOALS:   Physical Therapy Goals        Problem: Physical Therapy Goal    Goal Priority Disciplines Outcome Goal Variances Interventions   Physical Therapy Goal     PT/OT, PT Ongoing (interventions implemented as appropriate)     Description:  Goals to be met by: 3/4/17     Patient will increase functional independence with mobility by performin. Supine to sit with MInimal Assistance. - Met  2. Sit to supine with MInimal Assistance. - Not Met  3. Rolling to Left and Right with Minimal Assistance. - Met  4. Sit to stand transfer with Minimal Assistance. - Met  5. Bed to chair transfer with Minimal Assistance using Rolling Walker. - Met  6. Gait  x 120 feet with Minimal Assistance using Rolling Walker. - Met   7. Ascend/descend 1 stair with bilateral Handrails Minimal Assistance. - Not Met   8. Sitting at edge of bed x 10 minutes with Supervision. - Not Met  9. Stand for 10 minutes with Laurent using Rolling Walker. - Not Met  10. Lower extremity exercise program x 20 reps per handout, with assistance as needed. - Not Met    Revised goals(in addition to unmet goals above) to be met by: 3/4/17     Patient will increase functional independence with mobility by performin. Supine to sit with Mod. Indep. - Not Met  3. Rolling to Left and Right with Mod. Indep. - Not Met  4. Sit to stand transfer with Mod. Indep. - Not Met  5. Bed to chair transfer with Mod. Indep. using Rolling Walker. - Not Met  6. Gait  x 250 feet with Mod. Indep. using Rolling Walker. - Not Met                      PLAN:    Patient to be seen 5 x/week  to address the above listed problems via gait training, therapeutic activities, therapeutic exercises  Plan of Care expires: 17  Plan of Care reviewed with: patient         Geovanny Asencio, PT  2017

## 2017-02-21 NOTE — ASSESSMENT & PLAN NOTE
- Held oral medications on admit; hyper/hypoglycemia protocols in place; diabetic diet  - Continue gabapentin; increased gabapentin to 600 mg TID

## 2017-02-21 NOTE — PLAN OF CARE
Discharge planning: Therapy recommendation is rehab. [facility] rehab consult placed in [facility].

## 2017-02-21 NOTE — ASSESSMENT & PLAN NOTE
"- MRI Lumbar and Thoracic Spine shows moderate foraminal stenoses at L5-S1 related to bilateral facet arthropathy with fluid in the facet joints and a mild circumferential disc bulge.  - Pt reports urinary incontinence x1 2/2 "could not make it to the bathroom due to pain"  - Discussed MRI and case with Neurosurgery; neurosurgery declined consult at this time and recommended measuring post void residual.  If residual >100 recommend starting flomax.  - PT/OT consulted and recommending SNF.  2/21: Will continue current home pain regimen: Ibuprofen 200 mg q 6 hrs prn, tramadol 50 mg q 6 hrs prn. Pending SNF placement.    "
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
## 2017-02-21 NOTE — ASSESSMENT & PLAN NOTE
- Continue atenolol  2/18-2/19: Held ARB and HCTZ due to increase in Cr. Will monitor.  2/21: Cr 1.1; BP remains controlled

## 2017-02-21 NOTE — PLAN OF CARE
Problem: Occupational Therapy Goal  Goal: Occupational Therapy Goal  Goals to be met by: 03/03/17     Patient will increase functional independence with ADLs by performing:    LE Dressing with Moderate Assistance. Met goal  Grooming while EOB with Set-up Assistance.  Toileting from bedside commode with Moderate Assistance for hygiene and clothing management.   Rolling to Bilateral with Minimal Assistance.   Supine to sit with Moderate Assistance.  Toilet transfer to bedside commode with Moderate Assistance. Met goal  Upper extremity exercise program x10 reps per handout, with supervision.   Continue OT POC    Comments:   Chance Giron OTR/L  2/21/2017

## 2017-02-21 NOTE — PT/OT/SLP PROGRESS
Occupational Therapy  Treatment    Luis Bryant   MRN: [id number]   Admitting Diagnosis: Lumbar disc disease with radiculopathy    OT Date of Treatment: 02/21/17   OT Start Time: 1040  OT Stop Time: 1103  OT Total Time (min): 23 min    Billable Minutes:  Therapeutic Activity 12 minutes and Therapeutic Exercise 11 minutes    General Precautions: Standard, fall  Orthopedic Precautions: N/A  Braces: N/A         Subjective:  Communicated with nurse prior to session.  Pt agreeable to skilled OT services.    Pain Rating: 3/10  Location - Side: Right  Location - Orientation: generalized  Location: leg  Pain Addressed: Distraction  Pain Rating Post-Intervention: 3/10    Objective:  Patient found with: peripheral IV     Functional Mobility:  Bed Mobility:       Transfers:   Sit <> Stand Assistance: Stand By Assistance  Sit <> Stand Assistive Device: No Assistive Device  Bed <> Chair Technique: Stand Pivot  Bed <> Chair Transfer Assistance: Stand By Assistance  Bed <> Chair Assistive Device: Rolling Walker  Toilet Transfer Technique: Stand Pivot  Toilet Transfer Assistance: Stand By Assistance  Toilet Transfer Assistive Device: Rolling Walker, grab bar    Functional Ambulation: Pt ambulated 20 ft at sba w/ 2ww.    Activities of Daily Living:  LE Dressing Level of Assistance: Stand by assistance (donned socks; pt educated and was provided w/ demonstration of dressing technique to don socks)      Balance:   Static Sit: GOOD: Takes MODERATE challenges from all directions  Dynamic Sit: GOOD: Maintains balance through MODERATE excursions of active trunk movement  Static Stand: GOOD: Takes MODERATE challenges from all directions  Dynamic stand: GOOD-: Needs SUPERVISION only during gait and able to self right with moderate     Therapeutic Activities and Exercises:  Pt performed BUE strengthening of triceps ext for 3x10 reps w/ red resistive theraband.   Pt performed BUE strengthening of seated rows for 3x10 reps w/ red resistive  theraband.   Pt performed BUE strengthening of bicep curls for 3x10 reps w/ red resistive theraband.  Pt performed BUE strengthening shldr external rotation for 3x10 reps w/ red resistive theraband.   Pt performed sit<>stand from elevated HOB for 3x10 reps at supv.   Pt educated on safety w/ oob.  Pt educated on techniques to reduce stress/strain to his back.    AM-PAC 6 CLICK ADL   How much help from another person does this patient currently need?   1 = Unable, Total/Dependent Assistance  2 = A lot, Maximum/Moderate Assistance  3 = A little, Minimum/Contact Guard/Supervision  4 = None, Modified [place]/Independent    Putting on and taking off regular lower body clothing? : 4  Bathing (including washing, rinsing, drying)?: 2  Toileting, which includes using toilet, bedpan, or urinal? : 3  Putting on and taking off regular upper body clothing?: 3  Taking care of personal grooming such as brushing teeth?: 4  Eating meals?: 4  Total Score: 20     AM-PAC Raw Score CMS G-Code Modifier Level of Impairment Assistance   6 % Total / Unable   7 - 9 CM 80 - 100% Maximal Assist   10 - 14 CL 60 - 80% Moderate Assist   15 - 19 CK 40 - 60% Moderate Assist   20 - 22 CJ 20 - 40% Minimal Assist   23 CI 1-20% SBA / CGA   24 CH 0% Independent/ Mod I     Patient left up in chair with call button in reach    ASSESSMENT:  Luis Bryant is a 61 y.o. male with a medical diagnosis of Lumbar disc disease with radiculopathy and presents with impairments listed below. Pt ahs displayed significant improvement from initial evaluation. Pt recommended for in-patient rehab to gain further independence and re-assume life roles Pt would benefit from skilled OT services to improve independence and overall occupational functioning.    Rehab identified problem list/impairments: Rehab identified problem list/impairments: weakness, impaired endurance, impaired self care skills, impaired functional mobilty, gait instability, impaired balance,  decreased lower extremity function, pain    Rehab potential is good.    Activity tolerance: Good    Discharge recommendations: Discharge Facility/Level Of Care Needs: rehabilitation facility (in-patient rehab)     Barriers to discharge: Barriers to Discharge: Inaccessible home environment, Decreased caregiver support    Equipment recommendations: bedside commode, bath bench, wheelchair     GOALS:   Occupational Therapy Goals        Problem: Occupational Therapy Goal    Goal Priority Disciplines Outcome Interventions   Occupational Therapy Goal     OT, PT/OT Ongoing (interventions implemented as appropriate)    Description:  Goals to be met by: 03/03/17     Patient will increase functional independence with ADLs by performing:    LE Dressing with Moderate Assistance. Met goal  Grooming while EOB with Set-up Assistance.  Toileting from bedside commode with Moderate Assistance for hygiene and clothing management.   Rolling to Bilateral with Minimal Assistance.   Supine to sit with Moderate Assistance.  Toilet transfer to bedside commode with Moderate Assistance. Met goal  Upper extremity exercise program x10 reps per handout, with supervision.                 Plan:  Patient to be seen 5 x/week to address the above listed problems via self-care/home management, therapeutic activities, therapeutic exercises  Plan of Care expires: 03/18/17  Plan of Care reviewed with: patient    Chance Giron, OT  02/21/2017

## 2017-02-21 NOTE — SUBJECTIVE & OBJECTIVE
Interval History: Pt had no acute events overnight. This AM, pt working with PT. Pt participating and improving with physical therapy. Pt has no other complaints at this time.     Review of Systems   Constitutional: Positive for activity change and appetite change. Negative for chills, diaphoresis, fatigue and unexpected weight change.   HENT: Negative for congestion.    Respiratory: Negative for shortness of breath and wheezing.    Cardiovascular: Negative for chest pain, palpitations and leg swelling.   Gastrointestinal: Negative for abdominal pain, constipation, diarrhea, nausea and vomiting.   Genitourinary: Negative for flank pain, frequency, hematuria and urgency.   Musculoskeletal: Positive for arthralgias, back pain and gait problem. Negative for myalgias, neck pain and neck stiffness.   Neurological: Positive for numbness. Negative for dizziness, tremors, syncope, facial asymmetry, speech difficulty, weakness and headaches.   Psychiatric/Behavioral: Positive for sleep disturbance. Negative for agitation, confusion, dysphoric mood and suicidal ideas.     Objective:     Vital Signs (Most Recent):  Temp: 98.9 °F (37.2 °C) (02/21/17 0800)  Pulse: 66 (02/21/17 0800)  Resp: 18 (02/21/17 0800)  BP: 137/79 (02/21/17 0800)  SpO2: 97 % (02/21/17 0800) Vital Signs (24h Range):  Temp:  [98.1 °F (36.7 °C)-99 °F (37.2 °C)] 98.9 °F (37.2 °C)  Pulse:  [66-73] 66  Resp:  [18] 18  SpO2:  [95 %-97 %] 97 %  BP: (120-142)/(64-79) 137/79     Weight: 95.3 kg (210 lb)  Body mass index is 29.29 kg/(m^2).    Intake/Output Summary (Last 24 hours) at 02/21/17 1151  Last data filed at 02/21/17 0800   Gross per 24 hour   Intake                0 ml   Output             1500 ml   Net            -1500 ml      Physical Exam   Constitutional: He is oriented to person, place, and time. He appears well-developed and well-nourished. No distress.   Cardiovascular: Normal rate, regular rhythm, normal heart sounds and intact distal pulses.    No  murmur heard.  Pulmonary/Chest: Effort normal and breath sounds normal. No respiratory distress.   Abdominal: Soft. Bowel sounds are normal. He exhibits no distension. There is no tenderness.   Musculoskeletal: He exhibits no edema.        Right hip: He exhibits decreased range of motion.        Lumbar back: He exhibits tenderness.   Neurological: He is alert and oriented to person, place, and time. He has normal reflexes. No cranial nerve deficit or sensory deficit.   Skin: He is not diaphoretic.   Psychiatric: He has a normal mood and affect. His behavior is normal.   Nursing note and vitals reviewed.      Significant Labs: All pertinent labs within the past 24 hours have been reviewed.    Significant Imaging: I have reviewed all pertinent imaging results/findings within the past 24 hours.
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
## 2017-02-21 NOTE — PT/OT/SLP PROGRESS
Physical Therapy  Treatment    Luis Bryant   MRN: [id number]   Admitting Diagnosis: Lumbar disc disease with radiculopathy    PT Received On: 17  PT Start Time: 1026     PT Stop Time: 1053    PT Total Time (min): 27 min       Billable Minutes:  Gait Training 17 mins and Therapeutic Activity 10 mins    Treatment Type: Treatment  PT/PTA: PT     PTA Visit Number: 0       General Precautions: Standard, fall  Orthopedic Precautions: N/A     Do you have any cultural, spiritual, Moravian conflicts, given your current situation?: none    Subjective:  Communicated with RN prior to session.  Pt agreeable to session, reporting that he wants to be able to walk but has been dragging the R foot    Pain Ratin/10  Location - Side: Right  Location - Orientation: lower  Location: back (and leg)  Pain Addressed: Reposition, Distraction, Pre-medicate for activity, Nurse notified  Pain Rating Post-Intervention: 4/10    Objective:   Patient found with: peripheral IV    Functional Mobility:  Bed Mobility:   Rolling to L: Mod A  Scooting: CGA  Supine to sit: Mod A (HOB elevated)    Transfers:  Sit<>stand: Min A with SW    Gait:   40 feet with min A with SW. Pt demos swing to gait with decreased dom; increased time in double stance; decreased step length; decreased velocity of limb motion; decreased weight-shifting ability    Balance:   Static Sit: FAIR+: Able to take MINIMAL challenges from all directions  Dynamic Sit: FAIR+: Maintains balance through MINIMAL excursions of active trunk motion  Static Stand: FAIR: Maintains without assist but unable to take challenges  Dynamic stand: POOR: N/A     Therapeutic Activities and Exercises:  Pt educated this session on performing R foot DF and then R hip/knee flexion in order to ensure clearing of R foot during gait, pt demos understanding. Pt is also educated to have upright posture during gait, which pt is able to complete with v/cs. Pt reports that gait is easier and less painful  with upright posture and using DF to clear R toes. Pt is able to complete all mobility this session with increased time. Pt is able to perform transfers with cues for hand placement during session.  Pt is also educated in complete B ankle pumps while seated in bedside chair, pt verbalized understanding.     AM-PAC 6 CLICK MOBILITY  How much help from another person does this patient currently need?   1 = Unable, Total/Dependent Assistance  2 = A lot, Maximum/Moderate Assistance  3 = A little, Minimum/Contact Guard/Supervision  4 = None, Modified [place]/Independent    Turning over in bed (including adjusting bedclothes, sheets and blankets)?: 2  Sitting down on and standing up from a chair with arms (e.g., wheelchair, bedside commode, etc.): 3  Moving from lying on back to sitting on the side of the bed?: 2  Moving to and from a bed to a chair (including a wheelchair)?: 3  Need to walk in hospital room?: 3  Climbing 3-5 steps with a railing?: 1  Total Score: 14    AM-PAC Raw Score CMS G-Code Modifier Level of Impairment Assistance   6 % Total / Unable   7 - 9 CM 80 - 100% Maximal Assist   10 - 14 CL 60 - 80% Moderate Assist   15 - 19 CK 40 - 60% Moderate Assist   20 - 22 CJ 20 - 40% Minimal Assist   23 CI 1-20% SBA / CGA   24 CH 0% Independent/ Mod I     Patient left up in chair with all lines intact, call button in reach and RN notified.    Assessment:  Luis Bryant is a 61 y.o. male with a medical diagnosis of Lumbar disc disease with radiculopathy and presents with deficits listed below. Pt appears very motivated at this time and very thankful of assistance during session. Pt reports that this is the first time he has really been able to walk in ~ 1 month and wants to continue ambulating. Pt will need skilled PT to address deficits and increase functional mobility as able.    Rehab identified problem list/impairments: Rehab identified problem list/impairments: weakness, impaired endurance, gait  instability, impaired functional mobilty, decreased lower extremity function, decreased upper extremity function, impaired balance, decreased coordination, pain, decreased safety awareness    Rehab potential is good.    Activity tolerance: Good    Discharge recommendations: Discharge Facility/Level Of Care Needs: rehabilitation facility     Barriers to discharge: Barriers to Discharge: Inaccessible home environment, Decreased caregiver support    Equipment recommendations: Equipment Needed After Discharge: bedside commode, bath bench, wheelchair     GOALS:   Physical Therapy Goals        Problem: Physical Therapy Goal    Goal Priority Disciplines Outcome Goal Variances Interventions   Physical Therapy Goal     PT/OT, PT Ongoing (interventions implemented as appropriate)     Description:  Goals to be met by: 3/4/17     Patient will increase functional independence with mobility by performin. Supine to sit with MInimal Assistance.  2. Sit to supine with MInimal Assistance.  3. Rolling to Left and Right with Minimal Assistance.  4. Sit to stand transfer with Minimal Assistance.  5. Bed to chair transfer with Minimal Assistance using Rolling Walker.  6. Gait  x 120 feet with Minimal Assistance using Rolling Walker.   6. Ascend/descend 1 stair with bilateral Handrails Minimal Assistance.   7. Sitting at edge of bed x 10 minutes with Supervision.  8. Stand for 10 minutes with Laurent using Rolling Walker.  9. Lower extremity exercise program x 20 reps per handout, with assistance as needed.                PLAN:    Patient to be seen 5 x/week  to address the above listed problems via gait training, therapeutic activities, therapeutic exercises, neuromuscular re-education  Plan of Care expires: 17  Plan of Care reviewed with: patient         Nan Adam, PT  2017

## 2017-02-21 NOTE — PLAN OF CARE
Problem: Physical Therapy Goal  Goal: Physical Therapy Goal  Goals to be met by: 3/4/17     Patient will increase functional independence with mobility by performin. Supine to sit with MInimal Assistance.  2. Sit to supine with MInimal Assistance.  3. Rolling to Left and Right with Minimal Assistance.  4. Sit to stand transfer with Minimal Assistance.  5. Bed to chair transfer with Minimal Assistance using Rolling Walker.  6. Gait x 120 feet with Minimal Assistance using Rolling Walker.   6. Ascend/descend 1 stair with bilateral Handrails Minimal Assistance.   7. Sitting at edge of bed x 10 minutes with Supervision.  8. Stand for 10 minutes with Laurent using Rolling Walker.  9. Lower extremity exercise program x 20 reps per handout, with assistance as needed.   Outcome: Ongoing (interventions implemented as appropriate)  Goals remain appropriate

## 2017-02-21 NOTE — PLAN OF CARE
Problem: Patient Care Overview  Goal: Plan of Care Review  Outcome: Ongoing (interventions implemented as appropriate)  Plan of care reviewed with patient. No distress noted at this time. Fall precautions maintained with bed in lowest position, wheels locked, and call bell within reach. Glucose monitoring in place. Pt reports improvement in spasms and pain. Pt able to walk around room with walker. Will continue to monitor.

## 2017-02-21 NOTE — CONSULTS
"Consult Note  Physical Medicine & Rehab    Consult Requested By:  Key Hazel MD      Admit Date:  2/17/2017  Admitting Diagnosis:  Adult failure to thrive [R62.7], Low back pain [M54.5], Gait disturbance [R26.9], Lumbar disc disease with radiculopathy [M51.16]    Reason for Consult/Chief Complaint:  Rehab Evaluation    SUBJECTIVE:   History of Present Illness:  Luis Bryant is a 61-year-old male with PMHx of DMII, HTN, HLD, and chronic back pain with associated muscle spasms.  Patient presented to [facility] on 2/17/17 for worsening back pain resulting in immobility and bed rest x 3 weeks.  MRI T/L spine showed moderate stenosis L5-S1.  Neurosurgery consulted and no surgical intervention indicated.  Patient reports struggling with back pain and muscle spasms for about 2 years.  Pain starts in low back and radiates down right leg.  It is described as sharp and shooting with intermittent muscle spasms and numbness/tinging sensation.  He was recently told he has "sciatica."  S/p ZAFAR x 3, most recently on 2/15/17 at [facility].  After first and second injection, there was relief for "a month or two."  Prior to admission, he was not having any relief after recent ZAFAR. Now, pain and muscle spasms have significantly improved over past 2 days. When initially evaluated by PT and OT, patient required max-total assist for bed mobility and was unable to stand or perform transfers.  Since admission, he has showed great progress and now requires stand-by assistance with all mobility and ADLs.    Current Functional Status:  Improving with therapy.  Sit to stand SBA and transfers SBA.  Ambulated 120 ft SBA.  LBD SBA.    Functional History: Patient lives in [place], alone, in a single story home with one step to enter.  Prior to admission, he was essentially bed bound x 3 weeks (starting 1/27/17) 2/2 back pain.  Reports functional decline 2/2 back pain in November 2016.  Prior to 1/27/17, patient was independent with ADLs, including " working and driving, and mobility.  DME: [facility], [facility].    Review of Systems  Constitutional: No fever or chills.  No malaise or fatigue.  Skin: No rashes or lesions.   Eyes: No visual changes.  ENT: No nasal congestion, sore throat, or ear pain.  No difficulty swallowing.   Respiratory: No shortness of breath or wheezing.  No cough.  Cardiovascular: No chest pain or palpitations.  No edema.   Gl: No nausea or vomiting.  No abdominal pain.  No incontinence of bowel.  No constipation.   : No incontinence of bladder.   Musculoskeletal: Positive arthralgias.  Positive bone pain.  Positive muscle weakness.  Neurological: No seizures or tremors.  Positive weakness.  Positive sensory impairment.  Behavioral/Psych: No changes in mood or hallucinations.    Past Medical History   Diagnosis Date    Chronic back pain 2/17/2017    Depression     Depression 2/17/2017    Diabetes mellitus     HLD (hyperlipidemia) 2/17/2017    HTN (hypertension) 2/17/2017    Hypertension     Normocytic anemia 2/18/2017    Type 2 diabetes mellitus with diabetic neuropathy 2/17/2017     Past Surgical History   Procedure Laterality Date    Hand surgery       History reviewed. No pertinent family history.  Social History   Substance Use Topics    Smoking status: Never Smoker    Smokeless tobacco: None    Alcohol use Yes      Comment: occ     Review of patient's allergies indicates:   Allergen Reactions    Shellfish containing products Anaphylaxis    Percocet [oxycodone-acetaminophen]     Vicodin [hydrocodone-acetaminophen] Nausea And Vomiting        Scheduled Medications:   atenolol  25 mg Oral Daily    buPROPion  100 mg Oral BID    docusate sodium  100 mg Oral BID    enoxaparin  40 mg Subcutaneous Daily    erythromycin   Both Eyes QHS    gabapentin  600 mg Oral TID    methocarbamol  1,000 mg Oral TID    pravastatin  40 mg Oral QHS    trazodone  50 mg Oral QHS    valsartan  160 mg Oral Daily    venlafaxine  150 mg Oral Daily      PRN Medications:  acetaminophen, calcium carbonate, dextrose 50%, dextrose 50%, glucagon (human recombinant), glucose, glucose, ibuprofen, insulin aspart, ondansetron, ondansetron, ramelteon, tramadol    OBJECTIVE:     Vital Signs (Most Recent)  Temp: 98.3 °F (36.8 °C) (02/22/17 0412)  Pulse: 65 (02/22/17 0412)  Resp: 18 (02/22/17 0412)  BP: (!) 162/84 (02/22/17 0412)  SpO2: 96 % (02/22/17 0412)    Vital Signs Range (Last 24H):  Temp:  [97.6 °F (36.4 °C)-98.9 °F (37.2 °C)]   Pulse:  [65-69]   Resp:  [18]   BP: (127-162)/(72-84)   SpO2:  [96 %-98 %]     Physical Exam:  Vital signs reviewed.   General appearance:  No apparent distress.  Appears well-developed and well-nourished.  Skin:  Color and texture normal.  Warm and dry.  No jaundice.  No visible rashes or visible lesions.  HEENT:  Normocephalic and atraumatic.  No scleral icterus.  No nasal discharge.   Pulmonary:  Normal respiratory effort.    Cardiac:  Normal heart rate.  Extremities: No calf tenderness.  Distal pulses intact.  No edema.    Abdomen:  Soft, non-tender and non-distended.  Musculoskeletal:  Moves all extremities spontaneously.  ROM: normal.    Neurologic:  AAOx3.  Follows commands.  -  Motor:  RUE: 5/5.  LUE: 5/5.  RLE: 4/5.  LLE: 5/5.  -  Tone:  Normal.   -  Sensory:  Decreased on RLE to light touch and pin prick.   Behavioral/Psych: Calm and cooperative.    Diagnostic Results:  MRI: Reviewed  Labs: Reviewed    ASSESSMENT/PLAN:      Luis Bryant is a 61 y.o. male with history of chronic back pain with associated muscle spasms s/p ZAFAR x 3 most recently 2/15/17 at [facility] admitted on 2/17/2017 for worsening back pain resulting in immobility and bed rest x 3 weeks.  MRI T/L spine showed moderate stenosis L5-S1.  Neurosurgery consulted and no surgical intervention indicated.      -  Medical status:  Lumbar disc disease with radiculopathy:  Improving.  When initially evaluated by PT and OT, patient required max-total assist for bed mobility  and was unable to stand or perform transfers.  Since admission, he has greatly improved and now requires stand-by assistance with all mobility and ADLs.  He reports pain and muscle spasms have significantly improved.   -  Functional status:  Improving with therapy.  Sit to stand SBA and transfers SBA.  Ambulated 120 ft SBA.  LBD SBA.  -  DVT prophylaxis:  Scheduled: Lovenox 40 mg daily.  -  Rehab goals:  Patient has good goals related to weakness, impaired endurance, impaired self care skills, impaired functional mobilty, gait instability, impaired balance, pain.  -  Reviewed discharge options with patient.  We discussed [facility], inpatient rehab, home health therapy, and outpatient therapy.  I encourage him to remain motivated and to work hard with therapy.    Patient is progressing well with therapy and now with limited goals for inpatient rehab.  Recommend [facility].  If patient unwilling to participate in HB, recommend outpatient PT/OT.  Will sign off.  Please call with questions/concerns or re-consult if situation changes.    Thank you for consult.    JAYRO Mccormick, FNP-C    Physical Medicine & Rehabilitation   02/22/2017  Spectralink: [id number]    Collaborating Physician : Barber Bustos MD

## 2017-02-22 LAB
ANION GAP SERPL CALC-SCNC: 5 MMOL/L
BASOPHILS # BLD AUTO: 0.02 K/UL
BASOPHILS NFR BLD: 0.5 %
BUN SERPL-MCNC: 18 MG/DL
CALCIUM SERPL-MCNC: 9.2 MG/DL
CHLORIDE SERPL-SCNC: 105 MMOL/L
CO2 SERPL-SCNC: 27 MMOL/L
CREAT SERPL-MCNC: 1.1 MG/DL
DIFFERENTIAL METHOD: ABNORMAL
EOSINOPHIL # BLD AUTO: 0.3 K/UL
EOSINOPHIL NFR BLD: 5.8 %
ERYTHROCYTE [DISTWIDTH] IN BLOOD BY AUTOMATED COUNT: 12.4 %
EST. GFR  (AFRICAN AMERICAN): >60 ML/MIN/1.73 M^2
EST. GFR  (NON AFRICAN AMERICAN): >60 ML/MIN/1.73 M^2
GLUCOSE SERPL-MCNC: 94 MG/DL
HCT VFR BLD AUTO: 32.4 %
HGB BLD-MCNC: 10.9 G/DL
LYMPHOCYTES # BLD AUTO: 1.7 K/UL
LYMPHOCYTES NFR BLD: 39.9 %
MCH RBC QN AUTO: 28.4 PG
MCHC RBC AUTO-ENTMCNC: 33.6 %
MCV RBC AUTO: 84 FL
MONOCYTES # BLD AUTO: 0.6 K/UL
MONOCYTES NFR BLD: 14.8 %
NEUTROPHILS # BLD AUTO: 1.7 K/UL
NEUTROPHILS NFR BLD: 38.8 %
PLATELET # BLD AUTO: 258 K/UL
PMV BLD AUTO: 8.9 FL
POCT GLUCOSE: 103 MG/DL (ref 70–110)
POCT GLUCOSE: 105 MG/DL (ref 70–110)
POCT GLUCOSE: 192 MG/DL (ref 70–110)
POTASSIUM SERPL-SCNC: 4 MMOL/L
RBC # BLD AUTO: 3.84 M/UL
SODIUM SERPL-SCNC: 137 MMOL/L
WBC # BLD AUTO: 4.31 K/UL

## 2017-02-22 PROCEDURE — 80048 BASIC METABOLIC PNL TOTAL CA: CPT

## 2017-02-22 PROCEDURE — 25000003 PHARM REV CODE 250: Performed by: PHYSICIAN ASSISTANT

## 2017-02-22 PROCEDURE — 82962 GLUCOSE BLOOD TEST: CPT

## 2017-02-22 PROCEDURE — 36415 COLL VENOUS BLD VENIPUNCTURE: CPT

## 2017-02-22 PROCEDURE — 97530 THERAPEUTIC ACTIVITIES: CPT

## 2017-02-22 PROCEDURE — 25000003 PHARM REV CODE 250: Performed by: INTERNAL MEDICINE

## 2017-02-22 PROCEDURE — G8982 BODY POS GOAL STATUS: HCPCS | Mod: CL

## 2017-02-22 PROCEDURE — 63600175 PHARM REV CODE 636 W HCPCS: Performed by: PHYSICIAN ASSISTANT

## 2017-02-22 PROCEDURE — 99225 PR SUBSEQUENT OBSERVATION CARE,LEVEL II: CPT | Mod: ,,, | Performed by: PHYSICIAN ASSISTANT

## 2017-02-22 PROCEDURE — 85025 COMPLETE CBC W/AUTO DIFF WBC: CPT

## 2017-02-22 PROCEDURE — G0378 HOSPITAL OBSERVATION PER HR: HCPCS

## 2017-02-22 PROCEDURE — 97116 GAIT TRAINING THERAPY: CPT

## 2017-02-22 PROCEDURE — G8983 BODY POS D/C STATUS: HCPCS | Mod: CJ

## 2017-02-22 RX ORDER — AMOXICILLIN 250 MG
1 CAPSULE ORAL DAILY PRN
Status: DISCONTINUED | OUTPATIENT
Start: 2017-02-22 | End: 2017-02-23 | Stop reason: HOSPADM

## 2017-02-22 RX ORDER — LABETALOL HYDROCHLORIDE 5 MG/ML
10 INJECTION, SOLUTION INTRAVENOUS EVERY 6 HOURS PRN
Status: DISCONTINUED | OUTPATIENT
Start: 2017-02-22 | End: 2017-02-23 | Stop reason: HOSPADM

## 2017-02-22 RX ADMIN — GABAPENTIN 600 MG: 300 CAPSULE ORAL at 01:02

## 2017-02-22 RX ADMIN — ATENOLOL 25 MG: 25 TABLET ORAL at 09:02

## 2017-02-22 RX ADMIN — METHOCARBAMOL 1000 MG: 500 TABLET ORAL at 05:02

## 2017-02-22 RX ADMIN — TRAZODONE HYDROCHLORIDE 50 MG: 50 TABLET ORAL at 09:02

## 2017-02-22 RX ADMIN — VALSARTAN 160 MG: 160 TABLET, FILM COATED ORAL at 09:02

## 2017-02-22 RX ADMIN — VENLAFAXINE HYDROCHLORIDE 150 MG: 150 CAPSULE, EXTENDED RELEASE ORAL at 09:02

## 2017-02-22 RX ADMIN — BUPROPION HYDROCHLORIDE 100 MG: 100 TABLET, FILM COATED ORAL at 09:02

## 2017-02-22 RX ADMIN — METHOCARBAMOL 1000 MG: 500 TABLET ORAL at 01:02

## 2017-02-22 RX ADMIN — ERYTHROMYCIN 1 INCH: 5 OINTMENT OPHTHALMIC at 08:02

## 2017-02-22 RX ADMIN — GABAPENTIN 600 MG: 300 CAPSULE ORAL at 05:02

## 2017-02-22 RX ADMIN — ACETAMINOPHEN 650 MG: 325 TABLET, FILM COATED ORAL at 07:02

## 2017-02-22 RX ADMIN — GABAPENTIN 600 MG: 300 CAPSULE ORAL at 09:02

## 2017-02-22 RX ADMIN — DOCUSATE SODIUM 100 MG: 100 CAPSULE, LIQUID FILLED ORAL at 09:02

## 2017-02-22 RX ADMIN — TRAMADOL HYDROCHLORIDE 50 MG: 50 TABLET, FILM COATED ORAL at 08:02

## 2017-02-22 RX ADMIN — DOCUSATE SODIUM 100 MG: 100 CAPSULE, LIQUID FILLED ORAL at 08:02

## 2017-02-22 RX ADMIN — ENOXAPARIN SODIUM 40 MG: 100 INJECTION SUBCUTANEOUS at 01:02

## 2017-02-22 RX ADMIN — LABETALOL HYDROCHLORIDE 10 MG: 5 INJECTION, SOLUTION INTRAVENOUS at 08:02

## 2017-02-22 RX ADMIN — PRAVASTATIN SODIUM 40 MG: 40 TABLET ORAL at 09:02

## 2017-02-22 RX ADMIN — STANDARDIZED SENNA CONCENTRATE AND DOCUSATE SODIUM 1 TABLET: 8.6; 5 TABLET, FILM COATED ORAL at 01:02

## 2017-02-22 RX ADMIN — BUPROPION HYDROCHLORIDE 100 MG: 100 TABLET, FILM COATED ORAL at 08:02

## 2017-02-22 RX ADMIN — METHOCARBAMOL 1000 MG: 500 TABLET ORAL at 09:02

## 2017-02-22 NOTE — ASSESSMENT & PLAN NOTE
- Continue atenolol  2/18-2/19: Held ARB and HCTZ due to increase in Cr. Will monitor.  2/21-2/22: Cr 1.1; BP remains controlled

## 2017-02-22 NOTE — PLAN OF CARE
Visited with patient, finishing up session with PT on arrival.  PT recommends patient go home with either home health PT or outpatient PT.  Patient stated he wants to go to [place] so he wants to have outpatient PT Rx for discharge.  Stated he is alone here and has no one.    Patient is expected to discharge home 2/23/2017 with outpatient PT via cab ride home. Spoke with patient's sister, Judith Arthur, to discuss discharge plan and the need for a PCP in [place].  Stated she will talk with patient and other family members today and will contact this CM with final plan to fly patient home to [place] and name and number for PCP there.  Patient's son returned this CM's call, discussed situation with him also.  All family members will discuss and someone will call back with plan for patient.  Will continue to follow for needs.     UPDATE:    Received call from patient's son stating he is on his way driving to get his father from [place].  Stated he will be here by 2:00am and would like to  his father then.  Notified ROBERTA Robledo who stated patient can be discharged after 7:00am, notified son.  Spoke with patient's sister, Alize, informed her that patient's son is on his way to pick him up.  Stated patient will go to his sister Lori Smith's home ([phone], c:[phone]).      Also received call from [facility], patient has new PCP appointment with Dr Martine Jose 2/24/2017 @ 1500.  Faxed all requested information to clinic and informed sister, Judith, of date and time.  Spoke with Martha Miller (patient's boss, [phone]) per patient's request to have her forward insurance paperwork to Cindy at the [facility] ([phone], email: [email or web address]) and to provide information for Cindy to forward to insurance company when information complete.  Informed patient of above.      Informed Obs nurse patient's son will get into town around  2:00am, will come to hospital to  patient's house key and come back after 7:00am to  patient to drive him to [place] with understanding verbalized.  Patient given insurance paper work information and new PCP information.  Will continue to follow for needs.    Matilde Suazo RN, [facility] ([phone])         02/22/17 1230   Right Care Assessment   Can the patient answer the patient profile reliably? Yes, cognitively intact   How often would a person be available to care for the patient? Never   Describe the patient's ability to walk at the present time. Minor restrictions or changes   How does the patient rate their overall health at the present time? Good   During the past month, has the patient often been bothered by feeling down, depressed or hopeless? Yes   Have you felt down, depressed, or hopeless? 1   During the past month, has the patient often been bothered by little interest or pleasure in doing things? Yes   Have you felt little interest or pleasure in doing things? 1

## 2017-02-22 NOTE — SUBJECTIVE & OBJECTIVE
Interval History: Pt had no acute events overnight. This AM, pt sitting upright in chair. Pt reports improved ambulation with continued weakness. Pt has decided to live with son in [facility] instead of SNF. Pt has no other complaints at this time.    Review of Systems   Constitutional: Positive for activity change and appetite change. Negative for chills, diaphoresis, fatigue and unexpected weight change.   HENT: Negative for congestion.    Respiratory: Negative for shortness of breath and wheezing.    Cardiovascular: Negative for chest pain, palpitations and leg swelling.   Gastrointestinal: Negative for abdominal pain, constipation, diarrhea, nausea and vomiting.   Genitourinary: Negative for flank pain, frequency, hematuria and urgency.   Musculoskeletal: Positive for arthralgias, back pain and gait problem. Negative for myalgias, neck pain and neck stiffness.   Neurological: Positive for numbness. Negative for dizziness, tremors, syncope, facial asymmetry, speech difficulty, weakness and headaches.   Psychiatric/Behavioral: Positive for sleep disturbance. Negative for agitation, confusion, dysphoric mood and suicidal ideas.     Objective:     Vital Signs (Most Recent):  Temp: 99 °F (37.2 °C) (02/22/17 0800)  Pulse: 70 (02/22/17 0800)  Resp: 18 (02/22/17 0800)  BP: 125/76 (02/22/17 0800)  SpO2: 98 % (02/22/17 0800) Vital Signs (24h Range):  Temp:  [97.6 °F (36.4 °C)-99 °F (37.2 °C)] 99 °F (37.2 °C)  Pulse:  [65-70] 70  Resp:  [18] 18  SpO2:  [96 %-98 %] 98 %  BP: (125-162)/(75-84) 125/76     Weight: 95.3 kg (210 lb)  Body mass index is 29.29 kg/(m^2).    Intake/Output Summary (Last 24 hours) at 02/22/17 1516  Last data filed at 02/21/17 2000   Gross per 24 hour   Intake                0 ml   Output              650 ml   Net             -650 ml      Physical Exam   Constitutional: He is oriented to person, place, and time. He appears well-developed and well-nourished. No distress.   Cardiovascular: Normal  rate, regular rhythm, normal heart sounds and intact distal pulses.    No murmur heard.  Pulmonary/Chest: Effort normal and breath sounds normal. No respiratory distress.   Abdominal: Soft. Bowel sounds are normal. He exhibits no distension. There is no tenderness.   Musculoskeletal: He exhibits no edema.        Right hip: He exhibits decreased range of motion.        Lumbar back: He exhibits tenderness.   Neurological: He is alert and oriented to person, place, and time. He has normal reflexes. No cranial nerve deficit or sensory deficit.   Skin: He is not diaphoretic.   Psychiatric: He has a normal mood and affect. His behavior is normal.   Nursing note and vitals reviewed.      Significant Labs: All pertinent labs within the past 24 hours have been reviewed.    Significant Imaging: I have reviewed all pertinent imaging results/findings within the past 24 hours.

## 2017-02-22 NOTE — PT/OT/SLP PROGRESS
Physical Therapy  Treatment    Luis Bryant   MRN: [id number]   Admitting Diagnosis: Lumbar disc disease with radiculopathy    PT Received On: 02/22/17  PT Start Time: 1050     PT Stop Time: 1114    PT Total Time (min): 24 min       Billable Minutes:  Gait [id number] and Therapeutic Activity 8    Treatment Type: Treatment  PT/PTA: PT     PTA Visit Number: 0       General Precautions: Standard, fall  Orthopedic Precautions: N/A   Braces:      Do you have any cultural, spiritual, Sikhism conflicts, given your current situation?: none    Subjective:  Communicated with nursing prior to session.      Pain Rating:  (pt. did not rate)  Location - Side: Right  Location - Orientation: generalized  Location: leg  Pain Addressed: Reposition, Cessation of Activity       Objective:   Patient found with: peripheral IV    Functional Mobility:  Bed Mobility:   Rolling/Turning to Left:  (pt. found in bedside chair)    Transfers:  Sit <> Stand Assistance: Supervision  Sit <> Stand Assistive Device: Rolling Walker  Bed <> Chair Technique: Stand Pivot  Bed <> Chair Assistance: Supervision  Bed <> Chair Assistive Device: Rolling Walker    Gait:   Gait Distance: 250'  Assistance 1: Supervision  Gait Assistive Device: Rolling walker  Gait Pattern: swing-to gait  Gait Deviation(s): decreased dom, decreased step length (slight antalgic gait pattern)    Stairs:      Balance:   Static Sit: GOOD: Takes MODERATE challenges from all directions  Dynamic Sit: GOOD: Maintains balance through MODERATE excursions of active trunk movement  Static Stand: GOOD: Takes MODERATE challenges from all directions  Dynamic stand: FAIR+: Needs CLOSE SUPERVISION during gait and is able to right self with minor LOB     Therapeutic Activities and Exercises:  Discussed pt.'s progress, POC, HH PT vs. OP PT, and discharge planning.    AM-PAC 6 CLICK MOBILITY  How much help from another person does this patient currently need?   1 = Unable, Total/Dependent  Assistance  2 = A lot, Maximum/Moderate Assistance  3 = A little, Minimum/Contact Guard/Supervision  4 = None, Modified Driscoll/Independent    Turning over in bed (including adjusting bedclothes, sheets and blankets)?: 3  Sitting down on and standing up from a chair with arms (e.g., wheelchair, bedside commode, etc.): 4  Moving from lying on back to sitting on the side of the bed?: 3  Moving to and from a bed to a chair (including a wheelchair)?: 4  Need to walk in hospital room?: 4  Climbing 3-5 steps with a railing?: 3  Total Score: 21    AM-PAC Raw Score CMS G-Code Modifier Level of Impairment Assistance   6 % Total / Unable   7 - 9 CM 80 - 100% Maximal Assist   10 - 14 CL 60 - 80% Moderate Assist   15 - 19 CK 40 - 60% Moderate Assist   20 - 22 CJ 20 - 40% Minimal Assist   23 CI 1-20% SBA / CGA   24 CH 0% Independent/ Mod I     Patient left up in chair with all lines intact and call button in reach.    Assessment:  Luis Bryant is a 61 y.o. male with a medical diagnosis of Lumbar disc disease with radiculopathy and presents with increased endurance and gait distance. Pt. cooperative and tolerated treatment well. Pt. progressing with mobility. Pt. would benefit from continued PT to address functional deficits.    Rehab identified problem list/impairments: Rehab identified problem list/impairments: weakness, impaired endurance, impaired functional mobilty, gait instability, impaired balance, pain    Rehab potential is good.    Activity tolerance: Good    Discharge recommendations: Discharge Facility/Level Of Care Needs: outpatient PT     Barriers to discharge: Barriers to Discharge: Decreased caregiver support    Equipment recommendations: Equipment Needed After Discharge:  (TBD)     GOALS:   Physical Therapy Goals        Problem: Physical Therapy Goal    Goal Priority Disciplines Outcome Goal Variances Interventions   Physical Therapy Goal     PT/OT, PT Ongoing (interventions implemented as  appropriate)     Description:  Goals to be met by: 3/4/17     Patient will increase functional independence with mobility by performin. Supine to sit with MInimal Assistance. - Met  2. Sit to supine with MInimal Assistance. - Not Met  3. Rolling to Left and Right with Minimal Assistance. - Met  4. Sit to stand transfer with Minimal Assistance. - Met  5. Bed to chair transfer with Minimal Assistance using Rolling Walker. - Met  6. Gait  x 120 feet with Minimal Assistance using Rolling Walker. - Met   7. Ascend/descend 1 stair with bilateral Handrails Minimal Assistance. - Not Met   8. Sitting at edge of bed x 10 minutes with Supervision. - Not Met  9. Stand for 10 minutes with Laurent using Rolling Walker. - Not Met  10. Lower extremity exercise program x 20 reps per handout, with assistance as needed. - Not Met    Revised goals(in addition to unmet goals above) to be met by: 3/4/17     Patient will increase functional independence with mobility by performin. Supine to sit with Mod. Indep. - Not Met  3. Rolling to Left and Right with Mod. Indep. - Not Met  4. Sit to stand transfer with Mod. Indep. - Not Met  5. Bed to chair transfer with Mod. Indep. using Rolling Walker. - Not Met  6. Gait  x 250 feet with Mod. Indep. using Rolling Walker. - Not Met                      PLAN:    Patient to be seen 5 x/week  to address the above listed problems via gait training, therapeutic activities, therapeutic exercises  Plan of Care expires: 17  Plan of Care reviewed with: patient         Geovanny GEORGES Luis M, PT  2017

## 2017-02-22 NOTE — PLAN OF CARE
Problem: Pressure Ulcer Risk (Ronny Scale) (Adult,Obstetrics,Pediatric)  Goal: Identify Related Risk Factors and Signs and Symptoms  Related risk factors and signs and symptoms are identified upon initiation of Human Response Clinical Practice Guideline (CPG)   Outcome: Ongoing (interventions implemented as appropriate)  Pt to call for assistance with OOB activities to reduce fall risk. Accuchecks will be performed and insulin administered per MD order to control blood sugar levels WDL.  Pt will reposition self and spend time in chair and bed to reduce risk of pressure ulcers.
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
## 2017-02-22 NOTE — ASSESSMENT & PLAN NOTE
"- MRI Lumbar and Thoracic Spine shows moderate foraminal stenoses at L5-S1 related to bilateral facet arthropathy with fluid in the facet joints and a mild circumferential disc bulge.  - Pt reports urinary incontinence x1 2/2 "could not make it to the bathroom due to pain"  - Discussed MRI and case with Neurosurgery; neurosurgery declined consult at this time and recommended measuring post void residual.  If residual >100 recommend starting flomax.  - PT/OT consulted and recommending SNF.  2/21: Will continue current home pain regimen: Ibuprofen 200 mg q 6 hrs prn, tramadol 50 mg q 6 hrs prn. Pending SNF placement.  2/22: After discussion with CM/pt/family, pt has decided to go home with son, who resides in [place]. Plan for discharge tomorrow morning.  "

## 2017-02-22 NOTE — PLAN OF CARE
Problem: Patient Care Overview  Goal: Plan of Care Review  Pt with no falls or injuries this shift. Pt with no s/s hypo or hyperglycemia. No skin breakdown.

## 2017-02-22 NOTE — PLAN OF CARE
Problem: Physical Therapy Goal  Goal: Physical Therapy Goal  Goals to be met by: 3/4/17     Patient will increase functional independence with mobility by performin. Supine to sit with MInimal Assistance. - Met  2. Sit to supine with MInimal Assistance. - Not Met  3. Rolling to Left and Right with Minimal Assistance. - Met  4. Sit to stand transfer with Minimal Assistance. - Met  5. Bed to chair transfer with Minimal Assistance using Rolling Walker. - Met  6. Gait x 120 feet with Minimal Assistance using Rolling Walker. - Met   7. Ascend/descend 1 stair with bilateral Handrails Minimal Assistance. - Not Met   8. Sitting at edge of bed x 10 minutes with Supervision. - Not Met  9. Stand for 10 minutes with Laurent using Rolling Walker. - Not Met  10. Lower extremity exercise program x 20 reps per handout, with assistance as needed. - Not Met    Revised goals(in addition to unmet goals above) to be met by: 3/4/17     Patient will increase functional independence with mobility by performin. Supine to sit with Mod. Indep. - Not Met  3. Rolling to Left and Right with Mod. Indep. - Not Met  4. Sit to stand transfer with Mod. Indep. - Not Met  5. Bed to chair transfer with Mod. Indep. using Rolling Walker. - Not Met  6. Gait x 250 feet with Mod. Indep. using Rolling Walker. - Not Met        Outcome: Ongoing (interventions implemented as appropriate)  Pt. Close to meeting all goals

## 2017-02-23 VITALS
HEART RATE: 69 BPM | WEIGHT: 210 LBS | SYSTOLIC BLOOD PRESSURE: 156 MMHG | BODY MASS INDEX: 29.4 KG/M2 | HEIGHT: 71 IN | RESPIRATION RATE: 16 BRPM | OXYGEN SATURATION: 97 % | DIASTOLIC BLOOD PRESSURE: 80 MMHG | TEMPERATURE: 99 F

## 2017-02-23 LAB
ANION GAP SERPL CALC-SCNC: 7 MMOL/L
BASOPHILS # BLD AUTO: 0.04 K/UL
BASOPHILS NFR BLD: 0.9 %
BUN SERPL-MCNC: 18 MG/DL
CALCIUM SERPL-MCNC: 9.1 MG/DL
CHLORIDE SERPL-SCNC: 105 MMOL/L
CO2 SERPL-SCNC: 25 MMOL/L
CREAT SERPL-MCNC: 1.2 MG/DL
DIFFERENTIAL METHOD: ABNORMAL
EOSINOPHIL # BLD AUTO: 0.2 K/UL
EOSINOPHIL NFR BLD: 5.3 %
ERYTHROCYTE [DISTWIDTH] IN BLOOD BY AUTOMATED COUNT: 12.4 %
EST. GFR  (AFRICAN AMERICAN): >60 ML/MIN/1.73 M^2
EST. GFR  (NON AFRICAN AMERICAN): >60 ML/MIN/1.73 M^2
GLUCOSE SERPL-MCNC: 111 MG/DL
HCT VFR BLD AUTO: 31.8 %
HGB BLD-MCNC: 10.7 G/DL
LYMPHOCYTES # BLD AUTO: 1.8 K/UL
LYMPHOCYTES NFR BLD: 41 %
MCH RBC QN AUTO: 28.3 PG
MCHC RBC AUTO-ENTMCNC: 33.6 %
MCV RBC AUTO: 84 FL
MONOCYTES # BLD AUTO: 0.6 K/UL
MONOCYTES NFR BLD: 14.6 %
NEUTROPHILS # BLD AUTO: 1.7 K/UL
NEUTROPHILS NFR BLD: 38 %
PLATELET # BLD AUTO: 276 K/UL
PMV BLD AUTO: 9.1 FL
POCT GLUCOSE: 122 MG/DL (ref 70–110)
POCT GLUCOSE: 188 MG/DL (ref 70–110)
POTASSIUM SERPL-SCNC: 4 MMOL/L
RBC # BLD AUTO: 3.78 M/UL
SODIUM SERPL-SCNC: 137 MMOL/L
WBC # BLD AUTO: 4.37 K/UL

## 2017-02-23 PROCEDURE — 99217 PR OBSERVATION CARE DISCHARGE: CPT | Mod: ,,, | Performed by: PHYSICIAN ASSISTANT

## 2017-02-23 PROCEDURE — 82962 GLUCOSE BLOOD TEST: CPT

## 2017-02-23 PROCEDURE — 85025 COMPLETE CBC W/AUTO DIFF WBC: CPT

## 2017-02-23 PROCEDURE — 36415 COLL VENOUS BLD VENIPUNCTURE: CPT

## 2017-02-23 PROCEDURE — 80048 BASIC METABOLIC PNL TOTAL CA: CPT

## 2017-02-23 PROCEDURE — 25000003 PHARM REV CODE 250: Performed by: PHYSICIAN ASSISTANT

## 2017-02-23 PROCEDURE — G0378 HOSPITAL OBSERVATION PER HR: HCPCS

## 2017-02-23 RX ORDER — PRAVASTATIN SODIUM 40 MG/1
40 TABLET ORAL NIGHTLY
Qty: 30 TABLET | Refills: 0 | Status: SHIPPED | OUTPATIENT
Start: 2017-02-23 | End: 2018-02-23

## 2017-02-23 RX ORDER — VENLAFAXINE HYDROCHLORIDE 150 MG/1
150 CAPSULE, EXTENDED RELEASE ORAL DAILY
Qty: 30 CAPSULE | Refills: 0 | Status: SHIPPED | OUTPATIENT
Start: 2017-02-23 | End: 2018-02-23

## 2017-02-23 RX ORDER — GABAPENTIN 100 MG/1
600 CAPSULE ORAL 3 TIMES DAILY
Qty: 30 CAPSULE | Refills: 0 | Status: SHIPPED | OUTPATIENT
Start: 2017-02-23

## 2017-02-23 RX ADMIN — VENLAFAXINE HYDROCHLORIDE 150 MG: 150 CAPSULE, EXTENDED RELEASE ORAL at 09:02

## 2017-02-23 RX ADMIN — ATENOLOL 25 MG: 25 TABLET ORAL at 09:02

## 2017-02-23 RX ADMIN — VALSARTAN 160 MG: 160 TABLET, FILM COATED ORAL at 09:02

## 2017-02-23 RX ADMIN — GABAPENTIN 600 MG: 300 CAPSULE ORAL at 06:02

## 2017-02-23 RX ADMIN — METHOCARBAMOL 1000 MG: 500 TABLET ORAL at 06:02

## 2017-02-23 RX ADMIN — BUPROPION HYDROCHLORIDE 100 MG: 100 TABLET, FILM COATED ORAL at 09:02

## 2017-02-23 NOTE — ASSESSMENT & PLAN NOTE
"- MRI Lumbar and Thoracic Spine shows moderate foraminal stenoses at L5-S1 related to bilateral facet arthropathy with fluid in the facet joints and a mild circumferential disc bulge.  - Pt reports urinary incontinence x1 2/2 "could not make it to the bathroom due to pain"  - Discussed MRI and case with Neurosurgery; neurosurgery declined consult at this time and recommended measuring post void residual.  If residual >100 recommend starting flomax.  - PT/OT consulted and recommending SNF.  2/21: Will continue current home pain regimen: Ibuprofen 200 mg q 6 hrs prn, tramadol 50 mg q 6 hrs prn. Pending SNF placement.  2/22: After discussion with CM/pt/family, pt has decided to go home with son, who resides in [place]. Plan for discharge tomorrow morning.  2/23: Pt discharged home with son. Pt has scheduled follow up with PCP in [facility] tomorrow at 3 pm. Faxed discharge summary, PT/OT orders, and medications to physician.  "

## 2017-02-23 NOTE — NURSING
Pt dc per MD orders. Dc and prescription instructions given. Pt verbalizes understanding. PIV removed, catheter tip intact. Pt dc ambulatory with walker.

## 2017-02-23 NOTE — PLAN OF CARE
Patient discharged today with outpatient physical therapy with son to drive to [place].  Patient's new PCP appointment in [place] date and time given to patient and is on AVS.         02/23/17 1045   Final Note   Assessment Type Final Discharge Note   Discharge Disposition Home   Hospital Follow Up  Appt(s) scheduled? Yes   Discharge/Hospital Encounter Summary to (non-[facility]) PCP Yes   Referral to Outpatient Case Management complete? n/a   Referral to / orders for Home Health Complete? n/a   30 day supply of medicines given at discharge, if documented non-compliance / non-adherence? n/a   Any social issues identified prior to discharge? No   Did you assess the readiness or willingness of the family or caregiver to support self management of care? Yes   Right Care Referral Info   Post Acute Recommendation No Care

## 2017-02-23 NOTE — PT/OT/SLP DISCHARGE
Physical Therapy Discharge Summary    Luis Bryant  MRN: [id number]   Lumbar disc disease with radiculopathy   Patient Discharged from acute Physical Therapy on 2017.  Please refer to prior PT noted date on 2017 for functional status.     Assessment:   Goals partially met.  GOALS:   Physical Therapy Goals        Problem: Physical Therapy Goal    Goal Priority Disciplines Outcome Goal Variances Interventions   Physical Therapy Goal     PT/OT, PT Ongoing (interventions implemented as appropriate)     Description:  Goals to be met by: 3/4/17     Patient will increase functional independence with mobility by performin. Supine to sit with MInimal Assistance. - Met  2. Sit to supine with MInimal Assistance. - Not Met  3. Rolling to Left and Right with Minimal Assistance. - Met  4. Sit to stand transfer with Minimal Assistance. - Met  5. Bed to chair transfer with Minimal Assistance using Rolling Walker. - Met  6. Gait  x 120 feet with Minimal Assistance using Rolling Walker. - Met   7. Ascend/descend 1 stair with bilateral Handrails Minimal Assistance. - Not Met   8. Sitting at edge of bed x 10 minutes with Supervision. - Not Met  9. Stand for 10 minutes with Laurent using Rolling Walker. - Not Met  10. Lower extremity exercise program x 20 reps per handout, with assistance as needed. - Not Met    Revised goals(in addition to unmet goals above) to be met by: 3/4/17     Patient will increase functional independence with mobility by performin. Supine to sit with Mod. Indep. - Not Met  3. Rolling to Left and Right with Mod. Indep. - Not Met  4. Sit to stand transfer with Mod. Indep. - Not Met  5. Bed to chair transfer with Mod. Indep. using Rolling Walker. - Not Met  6. Gait  x 250 feet with Mod. Indep. using Rolling Walker. - Not Met                    Reasons for Discontinuation of Therapy Services  Transfer to alternate level of care.      Plan:  Patient Discharged to: Outpatient Therapy  Services.    Geovanny Asencio, PT  2/23/2017

## 2017-02-23 NOTE — PLAN OF CARE
Problem: Patient Care Overview  Goal: Plan of Care Review  Outcome: Outcome(s) achieved Date Met:  02/23/17  POC reviewed with pt. Safety precautions maintained. Pt up ad chrissy. Pt free of falls. bg monitored, achs. No pressure ulcer noted.

## 2017-02-23 NOTE — PLAN OF CARE
Problem: Patient Care Overview  Goal: Plan of Care Review  Outcome: Ongoing (interventions implemented as appropriate)  Patient FRANCISCOO,VSS.Complains of HA rating 3/10. POC reviewed with patient. nonskid socks placed on patient. Urinal provided at bedside. Bed in lowest position with wheels locked. Call bell and side table in reach of patient. Will continue to monitor patient.

## 2017-02-23 NOTE — ASSESSMENT & PLAN NOTE
- Continue atenolol  2/18-2/19: Held ARB and HCTZ due to increase in Cr. Will monitor.  2/21-2/22: Cr 1.1; BP remains controlled  2/23: BP not at goal. Cr returned to baseline. Resumed ARB and HCTZ on discharged
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
## 2017-02-23 NOTE — ASSESSMENT & PLAN NOTE
- Pt denies SI; mood has improved over the course of his stay  - Pt stopped taking wellbutrin prior to admission for several weeks  - [facility]. Resumed home medications.  - Pt will follow up with psych as outpatient

## 2017-02-24 NOTE — PT/OT/SLP DISCHARGE
Occupational Therapy Discharge Summary    Luis Bryant  MRN: [id number]   Lumbar disc disease with radiculopathy   Patient Discharged from acute Occupational Therapy on 2/23/2017.  Please refer to prior OT note dated on 2/21/2017 for functional status.     Assessment:   Patient was discharge unexpectedly.  Information required to complete and accurate discharge summary is unknown.  Refer to therapy initial evaluation and last progress note for initial and most recent functional status and goal achievement.  Recommendations made may be found in medical record.  GOALS:   Occupational Therapy Goals        Problem: Occupational Therapy Goal    Goal Priority Disciplines Outcome Interventions   Occupational Therapy Goal     OT, PT/OT Ongoing (interventions implemented as appropriate)    Description:  Goals to be met by: 03/03/17     Patient will increase functional independence with ADLs by performing:    LE Dressing with Moderate Assistance. Met goal  Grooming while EOB with Set-up Assistance.  Toileting from bedside commode with Moderate Assistance for hygiene and clothing management.   Rolling to Bilateral with Minimal Assistance.   Supine to sit with Moderate Assistance.  Toilet transfer to bedside commode with Moderate Assistance. Met goal  Upper extremity exercise program x10 reps per handout, with supervision.               Reasons for Discontinuation of Therapy Services  Transfer to alternate level of care.      Plan:  Patient Discharged to: Outpatient Therapy Services.    Chance Giron OTR/L  2/24/2017